# Patient Record
Sex: FEMALE | Race: BLACK OR AFRICAN AMERICAN | NOT HISPANIC OR LATINO | Employment: OTHER | URBAN - METROPOLITAN AREA
[De-identification: names, ages, dates, MRNs, and addresses within clinical notes are randomized per-mention and may not be internally consistent; named-entity substitution may affect disease eponyms.]

---

## 2017-02-03 ENCOUNTER — APPOINTMENT (OUTPATIENT)
Dept: GENERAL RADIOLOGY | Facility: HOSPITAL | Age: 77
End: 2017-02-03

## 2017-02-03 ENCOUNTER — HOSPITAL ENCOUNTER (EMERGENCY)
Facility: HOSPITAL | Age: 77
Discharge: HOME | End: 2017-02-03
Attending: EMERGENCY MEDICINE | Admitting: EMERGENCY MEDICINE

## 2017-02-03 VITALS
HEART RATE: 70 BPM | BODY MASS INDEX: 34.36 KG/M2 | SYSTOLIC BLOOD PRESSURE: 152 MMHG | OXYGEN SATURATION: 97 % | WEIGHT: 182 LBS | DIASTOLIC BLOOD PRESSURE: 96 MMHG | RESPIRATION RATE: 16 BRPM | HEIGHT: 61 IN | TEMPERATURE: 97.1 F
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
## 2017-02-03 DIAGNOSIS — S46.819A TRAPEZIUS MUSCLE STRAIN, UNSPECIFIED LATERALITY, INITIAL ENCOUNTER: ICD-10-CM

## 2017-02-03 PROCEDURE — 72050 X-RAY EXAM NECK SPINE 4/5VWS: CPT

## 2017-02-03 PROCEDURE — 99283 EMERGENCY DEPT VISIT LOW MDM: CPT

## 2017-02-03 RX ORDER — AMIODARONE HYDROCHLORIDE 200 MG/1
200 TABLET ORAL DAILY
COMMUNITY

## 2017-02-03 RX ORDER — DORZOLAMIDE HCL 20 MG/ML
1 SOLUTION/ DROPS OPHTHALMIC NIGHTLY
COMMUNITY

## 2017-02-03 RX ORDER — LOSARTAN POTASSIUM 100 MG/1
100 TABLET ORAL DAILY
COMMUNITY

## 2017-02-03 RX ORDER — BRIMONIDINE TARTRATE 2 MG/ML
1 SOLUTION/ DROPS OPHTHALMIC 3 TIMES DAILY
COMMUNITY

## 2017-02-03 RX ORDER — CETIRIZINE HYDROCHLORIDE 10 MG/1
10 TABLET ORAL DAILY
COMMUNITY

## 2017-02-03 RX ORDER — ALBUTEROL SULFATE 2.5 MG/3ML
2.5 SOLUTION RESPIRATORY (INHALATION) EVERY 6 HOURS PRN
COMMUNITY

## 2017-02-03 RX ORDER — CLOPIDOGREL BISULFATE 75 MG/1
75 TABLET ORAL DAILY
COMMUNITY
End: 2017-08-31

## 2017-02-03 RX ORDER — FUROSEMIDE 40 MG/1
40 TABLET ORAL DAILY
COMMUNITY

## 2017-02-03 RX ORDER — FERROUS SULFATE 325(65) MG
325 TABLET ORAL 2 TIMES DAILY
COMMUNITY

## 2017-02-03 RX ORDER — RANITIDINE 150 MG/1
150 TABLET ORAL EVERY MORNING
COMMUNITY
End: 2017-08-31

## 2017-02-03 RX ORDER — ALBUTEROL SULFATE 90 UG/1
2 AEROSOL, METERED RESPIRATORY (INHALATION) EVERY 6 HOURS PRN
COMMUNITY

## 2017-02-03 RX ORDER — HYDRALAZINE HYDROCHLORIDE 25 MG/1
25 TABLET, FILM COATED ORAL 2 TIMES DAILY
COMMUNITY

## 2017-02-03 RX ORDER — MELATONIN
1000 DAILY
COMMUNITY

## 2017-02-03 RX ORDER — POTASSIUM CHLORIDE 750 MG/1
10 CAPSULE, EXTENDED RELEASE ORAL 2 TIMES DAILY
COMMUNITY

## 2017-02-03 RX ORDER — TRAVOPROST OPHTHALMIC SOLUTION 0.04 MG/ML
1 SOLUTION OPHTHALMIC 3 TIMES DAILY
COMMUNITY
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
## 2017-08-30 PROBLEM — M16.11 PRIMARY OSTEOARTHRITIS OF RIGHT HIP: Chronic | Status: ACTIVE | Noted: 2017-08-30

## 2017-08-31 ENCOUNTER — APPOINTMENT (OUTPATIENT)
Dept: PREADMISSION TESTING | Facility: HOSPITAL | Age: 77
End: 2017-08-31

## 2017-08-31 ENCOUNTER — HOSPITAL ENCOUNTER (OUTPATIENT)
Dept: GENERAL RADIOLOGY | Facility: HOSPITAL | Age: 77
Discharge: HOME | End: 2017-08-31
Admitting: ORTHOPAEDIC SURGERY

## 2017-08-31 ENCOUNTER — HOSPITAL ENCOUNTER (OUTPATIENT)
Dept: GENERAL RADIOLOGY | Facility: HOSPITAL | Age: 77
Discharge: HOME | End: 2017-08-31

## 2017-08-31 VITALS
HEIGHT: 61 IN | HEART RATE: 58 BPM | OXYGEN SATURATION: 98 % | SYSTOLIC BLOOD PRESSURE: 133 MMHG | BODY MASS INDEX: 34.19 KG/M2 | RESPIRATION RATE: 20 BRPM | WEIGHT: 181.1 LBS | DIASTOLIC BLOOD PRESSURE: 75 MMHG | TEMPERATURE: 97.5 F

## 2017-08-31 LAB
ALBUMIN SERPL-MCNC: 4.2 G/DL (ref 3.5–5.2)
ALBUMIN/GLOB SERPL: 1.2 G/DL
ALP SERPL-CCNC: 82 U/L (ref 39–117)
ALT SERPL W P-5'-P-CCNC: 11 U/L (ref 1–33)
ANION GAP SERPL CALCULATED.3IONS-SCNC: 10.9 MMOL/L
APTT PPP: 29.9 SECONDS (ref 22.7–35.4)
AST SERPL-CCNC: 17 U/L (ref 1–32)
BACTERIA UR QL AUTO: NORMAL /HPF
BASOPHILS # BLD AUTO: 0.04 10*3/MM3 (ref 0–0.2)
BASOPHILS NFR BLD AUTO: 1 % (ref 0–1.5)
BILIRUB SERPL-MCNC: 0.4 MG/DL (ref 0.1–1.2)
BILIRUB UR QL STRIP: NEGATIVE
BUN BLD-MCNC: 12 MG/DL (ref 8–23)
BUN/CREAT SERPL: 7.9 (ref 7–25)
CALCIUM SPEC-SCNC: 9.6 MG/DL (ref 8.6–10.5)
CHLORIDE SERPL-SCNC: 102 MMOL/L (ref 98–107)
CLARITY UR: CLEAR
CO2 SERPL-SCNC: 31.1 MMOL/L (ref 22–29)
COLOR UR: YELLOW
CREAT BLD-MCNC: 1.51 MG/DL (ref 0.57–1)
DEPRECATED RDW RBC AUTO: 51.4 FL (ref 37–54)
EOSINOPHIL # BLD AUTO: 0.22 10*3/MM3 (ref 0–0.7)
EOSINOPHIL NFR BLD AUTO: 5.8 % (ref 0.3–6.2)
ERYTHROCYTE [DISTWIDTH] IN BLOOD BY AUTOMATED COUNT: 14.5 % (ref 11.7–13)
GFR SERPL CREATININE-BSD FRML MDRD: 41 ML/MIN/1.73
GLOBULIN UR ELPH-MCNC: 3.4 GM/DL
GLUCOSE BLD-MCNC: 92 MG/DL (ref 65–99)
GLUCOSE UR STRIP-MCNC: NEGATIVE MG/DL
HBA1C MFR BLD: 5.32 % (ref 4.8–5.6)
HCT VFR BLD AUTO: 41.7 % (ref 35.6–45.5)
HGB BLD-MCNC: 13 G/DL (ref 11.9–15.5)
HGB UR QL STRIP.AUTO: NEGATIVE
HYALINE CASTS UR QL AUTO: NORMAL /LPF
IMM GRANULOCYTES # BLD: 0 10*3/MM3 (ref 0–0.03)
IMM GRANULOCYTES NFR BLD: 0 % (ref 0–0.5)
INR PPP: 1.01 (ref 0.9–1.1)
KETONES UR QL STRIP: NEGATIVE
LEUKOCYTE ESTERASE UR QL STRIP.AUTO: ABNORMAL
LYMPHOCYTES # BLD AUTO: 1.31 10*3/MM3 (ref 0.9–4.8)
LYMPHOCYTES NFR BLD AUTO: 34.4 % (ref 19.6–45.3)
MCH RBC QN AUTO: 29.9 PG (ref 26.9–32)
MCHC RBC AUTO-ENTMCNC: 31.2 G/DL (ref 32.4–36.3)
MCV RBC AUTO: 95.9 FL (ref 80.5–98.2)
MONOCYTES # BLD AUTO: 0.4 10*3/MM3 (ref 0.2–1.2)
MONOCYTES NFR BLD AUTO: 10.5 % (ref 5–12)
NEUTROPHILS # BLD AUTO: 1.84 10*3/MM3 (ref 1.9–8.1)
NEUTROPHILS NFR BLD AUTO: 48.3 % (ref 42.7–76)
NITRITE UR QL STRIP: NEGATIVE
PH UR STRIP.AUTO: 7.5 [PH] (ref 5–8)
PLATELET # BLD AUTO: 289 10*3/MM3 (ref 140–500)
PMV BLD AUTO: 12.2 FL (ref 6–12)
POTASSIUM BLD-SCNC: 3.9 MMOL/L (ref 3.5–5.2)
PROT SERPL-MCNC: 7.6 G/DL (ref 6–8.5)
PROT UR QL STRIP: NEGATIVE
PROTHROMBIN TIME: 12.9 SECONDS (ref 11.7–14.2)
RBC # BLD AUTO: 4.35 10*6/MM3 (ref 3.9–5.2)
RBC # UR: NORMAL /HPF
REF LAB TEST METHOD: NORMAL
SODIUM BLD-SCNC: 144 MMOL/L (ref 136–145)
SP GR UR STRIP: 1.01 (ref 1–1.03)
SQUAMOUS #/AREA URNS HPF: NORMAL /HPF
UROBILINOGEN UR QL STRIP: ABNORMAL
WBC NRBC COR # BLD: 3.81 10*3/MM3 (ref 4.5–10.7)
WBC UR QL AUTO: NORMAL /HPF

## 2017-08-31 PROCEDURE — 93010 ELECTROCARDIOGRAM REPORT: CPT | Performed by: INTERNAL MEDICINE

## 2017-08-31 RX ORDER — DORZOLAMIDE HCL 20 MG/ML
1 SOLUTION/ DROPS OPHTHALMIC 3 TIMES DAILY
COMMUNITY
End: 2017-08-31

## 2017-08-31 RX ORDER — OXYMETAZOLINE HYDROCHLORIDE 0.05 G/100ML
3 SPRAY NASAL AS NEEDED
Status: ON HOLD | COMMUNITY
Start: 2015-01-15 | End: 2017-09-01

## 2017-08-31 RX ORDER — CHLORHEXIDINE GLUCONATE 500 MG/1
CLOTH TOPICAL
Status: ON HOLD | COMMUNITY
End: 2017-09-01

## 2017-09-01 ENCOUNTER — ANESTHESIA (OUTPATIENT)
Dept: PERIOP | Facility: HOSPITAL | Age: 77
End: 2017-09-01

## 2017-09-01 ENCOUNTER — APPOINTMENT (OUTPATIENT)
Dept: GENERAL RADIOLOGY | Facility: HOSPITAL | Age: 77
End: 2017-09-01

## 2017-09-01 ENCOUNTER — HOSPITAL ENCOUNTER (INPATIENT)
Facility: HOSPITAL | Age: 77
LOS: 3 days | Discharge: NURSING, REHAB OR LONG-TERM CARE | End: 2017-09-04
Attending: ORTHOPAEDIC SURGERY | Admitting: ORTHOPAEDIC SURGERY

## 2017-09-01 ENCOUNTER — ANESTHESIA EVENT (OUTPATIENT)
Dept: PERIOP | Facility: HOSPITAL | Age: 77
End: 2017-09-01

## 2017-09-01 DIAGNOSIS — R26.2 DIFFICULTY WALKING: Primary | ICD-10-CM

## 2017-09-01 PROBLEM — M16.10 OSTEOARTHRITIS OF ONE HIP: Status: ACTIVE | Noted: 2017-09-01

## 2017-09-01 PROCEDURE — 25010000002 HYDRALAZINE PER 20 MG: Performed by: ANESTHESIOLOGY

## 2017-09-01 PROCEDURE — 93010 ELECTROCARDIOGRAM REPORT: CPT | Performed by: INTERNAL MEDICINE

## 2017-09-01 PROCEDURE — 25010000002 HYDROMORPHONE PER 4 MG: Performed by: ANESTHESIOLOGY

## 2017-09-01 PROCEDURE — 73501 X-RAY EXAM HIP UNI 1 VIEW: CPT

## 2017-09-01 PROCEDURE — C1713 ANCHOR/SCREW BN/BN,TIS/BN: HCPCS | Performed by: ORTHOPAEDIC SURGERY

## 2017-09-01 PROCEDURE — C1776 JOINT DEVICE (IMPLANTABLE): HCPCS | Performed by: ORTHOPAEDIC SURGERY

## 2017-09-01 PROCEDURE — 25010000002 ONDANSETRON PER 1 MG: Performed by: ANESTHESIOLOGY

## 2017-09-01 PROCEDURE — 25010000002 MORPHINE PER 10 MG: Performed by: ORTHOPAEDIC SURGERY

## 2017-09-01 PROCEDURE — 25010000003 CEFAZOLIN IN DEXTROSE 2-4 GM/100ML-% SOLUTION: Performed by: ORTHOPAEDIC SURGERY

## 2017-09-01 PROCEDURE — 25010000002 PROPOFOL 10 MG/ML EMULSION: Performed by: ANESTHESIOLOGY

## 2017-09-01 PROCEDURE — 25010000002 MIDAZOLAM PER 1 MG: Performed by: ANESTHESIOLOGY

## 2017-09-01 PROCEDURE — 25010000002 KETOROLAC TROMETHAMINE PER 15 MG: Performed by: ORTHOPAEDIC SURGERY

## 2017-09-01 PROCEDURE — 97110 THERAPEUTIC EXERCISES: CPT

## 2017-09-01 PROCEDURE — 76000 FLUOROSCOPY <1 HR PHYS/QHP: CPT

## 2017-09-01 PROCEDURE — 25010000002 NEOSTIGMINE PER 0.5 MG: Performed by: NURSE ANESTHETIST, CERTIFIED REGISTERED

## 2017-09-01 PROCEDURE — 94640 AIRWAY INHALATION TREATMENT: CPT

## 2017-09-01 PROCEDURE — 25010000002 PHENYLEPHRINE PER 1 ML: Performed by: ANESTHESIOLOGY

## 2017-09-01 PROCEDURE — 25010000002 ROPIVACAINE PER 1 MG: Performed by: ORTHOPAEDIC SURGERY

## 2017-09-01 PROCEDURE — 25010000002 FENTANYL CITRATE (PF) 100 MCG/2ML SOLUTION: Performed by: ANESTHESIOLOGY

## 2017-09-01 PROCEDURE — 97162 PT EVAL MOD COMPLEX 30 MIN: CPT

## 2017-09-01 PROCEDURE — 25010000002 ONDANSETRON PER 1 MG: Performed by: ORTHOPAEDIC SURGERY

## 2017-09-01 PROCEDURE — 93005 ELECTROCARDIOGRAM TRACING: CPT | Performed by: ORTHOPAEDIC SURGERY

## 2017-09-01 PROCEDURE — 25010000002 CLONIDINE PER 1 MG: Performed by: ORTHOPAEDIC SURGERY

## 2017-09-01 PROCEDURE — 0SR904A REPLACEMENT OF RIGHT HIP JOINT WITH CERAMIC ON POLYETHYLENE SYNTHETIC SUBSTITUTE, UNCEMENTED, OPEN APPROACH: ICD-10-PCS | Performed by: ORTHOPAEDIC SURGERY

## 2017-09-01 PROCEDURE — 25010000002 DEXAMETHASONE PER 1 MG: Performed by: ANESTHESIOLOGY

## 2017-09-01 DEVICE — CORAIL HIP SYSTEM CEMENTLESS FEMORAL STEM 12/14 AMT 135 DEGREES KA SIZE 13 HA COATED STANDARD COLLAR
Type: IMPLANTABLE DEVICE | Site: HIP | Status: FUNCTIONAL
Brand: CORAIL

## 2017-09-01 DEVICE — PINNACLE CANCELLOUS BONE SCREW 6.5MM X 25MM
Type: IMPLANTABLE DEVICE | Site: HIP | Status: FUNCTIONAL
Brand: PINNACLE

## 2017-09-01 DEVICE — TOTL HIP STEM DEPUY UPCHRG: Type: IMPLANTABLE DEVICE | Site: HIP | Status: FUNCTIONAL

## 2017-09-01 DEVICE — TOTL HIP COP DEPUY 9641334: Type: IMPLANTABLE DEVICE | Site: HIP | Status: FUNCTIONAL

## 2017-09-01 DEVICE — TOTL HIP GRIPTION CUP DEPUY UPCHRG: Type: IMPLANTABLE DEVICE | Site: HIP | Status: FUNCTIONAL

## 2017-09-01 DEVICE — PINNACLE GRIPTION ACETABULAR SHELL SECTOR 50MM OD
Type: IMPLANTABLE DEVICE | Site: HIP | Status: FUNCTIONAL
Brand: PINNACLE GRIPTION

## 2017-09-01 DEVICE — BIOLOX DELTA CERAMIC FEMORAL HEAD 32MM DIA +5.0 12/14 TAPER
Type: IMPLANTABLE DEVICE | Site: HIP | Status: FUNCTIONAL
Brand: BIOLOX DELTA

## 2017-09-01 DEVICE — PINNACLE HIP SOLUTIONS ALTRX POLYETHYLENE ACETABULAR LINER NEUTRAL 32MM ID 50MM OD
Type: IMPLANTABLE DEVICE | Site: HIP | Status: FUNCTIONAL
Brand: PINNACLE ALTRX

## 2017-09-01 RX ORDER — HYDRALAZINE HYDROCHLORIDE 20 MG/ML
5 INJECTION INTRAMUSCULAR; INTRAVENOUS
Status: DISCONTINUED | OUTPATIENT
Start: 2017-09-01 | End: 2017-09-01 | Stop reason: HOSPADM

## 2017-09-01 RX ORDER — FENTANYL CITRATE 50 UG/ML
50 INJECTION, SOLUTION INTRAMUSCULAR; INTRAVENOUS
Status: DISCONTINUED | OUTPATIENT
Start: 2017-09-01 | End: 2017-09-01 | Stop reason: HOSPADM

## 2017-09-01 RX ORDER — MIDAZOLAM HYDROCHLORIDE 1 MG/ML
2 INJECTION INTRAMUSCULAR; INTRAVENOUS
Status: DISCONTINUED | OUTPATIENT
Start: 2017-09-01 | End: 2017-09-01 | Stop reason: HOSPADM

## 2017-09-01 RX ORDER — FUROSEMIDE 40 MG/1
40 TABLET ORAL DAILY
Status: DISCONTINUED | OUTPATIENT
Start: 2017-09-01 | End: 2017-09-01

## 2017-09-01 RX ORDER — CLOPIDOGREL BISULFATE 75 MG/1
75 TABLET ORAL DAILY
Status: DISCONTINUED | OUTPATIENT
Start: 2017-09-01 | End: 2017-09-04 | Stop reason: HOSPADM

## 2017-09-01 RX ORDER — CEFAZOLIN SODIUM 2 G/100ML
2 INJECTION, SOLUTION INTRAVENOUS ONCE
Status: COMPLETED | OUTPATIENT
Start: 2017-09-01 | End: 2017-09-01

## 2017-09-01 RX ORDER — BUDESONIDE AND FORMOTEROL FUMARATE DIHYDRATE 160; 4.5 UG/1; UG/1
2 AEROSOL RESPIRATORY (INHALATION) 2 TIMES DAILY
Status: DISCONTINUED | OUTPATIENT
Start: 2017-09-01 | End: 2017-09-03

## 2017-09-01 RX ORDER — ALBUTEROL SULFATE 2.5 MG/3ML
2.5 SOLUTION RESPIRATORY (INHALATION) EVERY 6 HOURS PRN
Status: DISCONTINUED | OUTPATIENT
Start: 2017-09-01 | End: 2017-09-04 | Stop reason: HOSPADM

## 2017-09-01 RX ORDER — CEFAZOLIN SODIUM 2 G/100ML
2 INJECTION, SOLUTION INTRAVENOUS EVERY 8 HOURS
Status: COMPLETED | OUTPATIENT
Start: 2017-09-01 | End: 2017-09-02

## 2017-09-01 RX ORDER — GLYCOPYRROLATE 0.2 MG/ML
INJECTION INTRAMUSCULAR; INTRAVENOUS AS NEEDED
Status: DISCONTINUED | OUTPATIENT
Start: 2017-09-01 | End: 2017-09-01 | Stop reason: SURG

## 2017-09-01 RX ORDER — EPHEDRINE SULFATE 50 MG/ML
5 INJECTION, SOLUTION INTRAVENOUS ONCE AS NEEDED
Status: DISCONTINUED | OUTPATIENT
Start: 2017-09-01 | End: 2017-09-01 | Stop reason: HOSPADM

## 2017-09-01 RX ORDER — SODIUM CHLORIDE 9 MG/ML
100 INJECTION, SOLUTION INTRAVENOUS CONTINUOUS
Status: DISCONTINUED | OUTPATIENT
Start: 2017-09-01 | End: 2017-09-02

## 2017-09-01 RX ORDER — POTASSIUM CHLORIDE 750 MG/1
10 CAPSULE, EXTENDED RELEASE ORAL 2 TIMES DAILY
Status: DISCONTINUED | OUTPATIENT
Start: 2017-09-01 | End: 2017-09-04 | Stop reason: HOSPADM

## 2017-09-01 RX ORDER — SODIUM CHLORIDE 9 MG/ML
100 INJECTION, SOLUTION INTRAVENOUS CONTINUOUS
Status: ACTIVE | OUTPATIENT
Start: 2017-09-01 | End: 2017-09-02

## 2017-09-01 RX ORDER — FAMOTIDINE 10 MG/ML
20 INJECTION, SOLUTION INTRAVENOUS ONCE
Status: COMPLETED | OUTPATIENT
Start: 2017-09-01 | End: 2017-09-01

## 2017-09-01 RX ORDER — DIPHENHYDRAMINE HYDROCHLORIDE 50 MG/ML
6.25 INJECTION INTRAMUSCULAR; INTRAVENOUS
Status: DISCONTINUED | OUTPATIENT
Start: 2017-09-01 | End: 2017-09-01 | Stop reason: HOSPADM

## 2017-09-01 RX ORDER — FERROUS SULFATE 325(65) MG
325 TABLET ORAL 2 TIMES DAILY
Status: DISCONTINUED | OUTPATIENT
Start: 2017-09-01 | End: 2017-09-04 | Stop reason: HOSPADM

## 2017-09-01 RX ORDER — BISACODYL 10 MG
10 SUPPOSITORY, RECTAL RECTAL DAILY PRN
Status: DISCONTINUED | OUTPATIENT
Start: 2017-09-01 | End: 2017-09-04 | Stop reason: HOSPADM

## 2017-09-01 RX ORDER — HYDROCODONE BITARTRATE AND ACETAMINOPHEN 7.5; 325 MG/1; MG/1
1 TABLET ORAL EVERY 4 HOURS PRN
Status: DISCONTINUED | OUTPATIENT
Start: 2017-09-01 | End: 2017-09-04 | Stop reason: HOSPADM

## 2017-09-01 RX ORDER — FENTANYL CITRATE 50 UG/ML
100 INJECTION, SOLUTION INTRAMUSCULAR; INTRAVENOUS
Status: DISCONTINUED | OUTPATIENT
Start: 2017-09-01 | End: 2017-09-01 | Stop reason: HOSPADM

## 2017-09-01 RX ORDER — OXYCODONE HCL 10 MG/1
10 TABLET, FILM COATED, EXTENDED RELEASE ORAL ONCE
Status: COMPLETED | OUTPATIENT
Start: 2017-09-01 | End: 2017-09-01

## 2017-09-01 RX ORDER — DEXAMETHASONE SODIUM PHOSPHATE 10 MG/ML
INJECTION INTRAMUSCULAR; INTRAVENOUS AS NEEDED
Status: DISCONTINUED | OUTPATIENT
Start: 2017-09-01 | End: 2017-09-01 | Stop reason: SURG

## 2017-09-01 RX ORDER — BISACODYL 5 MG/1
10 TABLET, DELAYED RELEASE ORAL DAILY PRN
Status: DISCONTINUED | OUTPATIENT
Start: 2017-09-01 | End: 2017-09-04 | Stop reason: HOSPADM

## 2017-09-01 RX ORDER — ONDANSETRON 2 MG/ML
INJECTION INTRAMUSCULAR; INTRAVENOUS AS NEEDED
Status: DISCONTINUED | OUTPATIENT
Start: 2017-09-01 | End: 2017-09-01 | Stop reason: SURG

## 2017-09-01 RX ORDER — LABETALOL HYDROCHLORIDE 5 MG/ML
5 INJECTION, SOLUTION INTRAVENOUS
Status: DISCONTINUED | OUTPATIENT
Start: 2017-09-01 | End: 2017-09-01 | Stop reason: HOSPADM

## 2017-09-01 RX ORDER — ACETAMINOPHEN 325 MG/1
325 TABLET ORAL EVERY 4 HOURS PRN
Status: DISCONTINUED | OUTPATIENT
Start: 2017-09-01 | End: 2017-09-04 | Stop reason: HOSPADM

## 2017-09-01 RX ORDER — PROPOFOL 10 MG/ML
VIAL (ML) INTRAVENOUS AS NEEDED
Status: DISCONTINUED | OUTPATIENT
Start: 2017-09-01 | End: 2017-09-01 | Stop reason: SURG

## 2017-09-01 RX ORDER — LIDOCAINE HYDROCHLORIDE 10 MG/ML
0.5 INJECTION, SOLUTION EPIDURAL; INFILTRATION; INTRACAUDAL; PERINEURAL ONCE AS NEEDED
Status: DISCONTINUED | OUTPATIENT
Start: 2017-09-01 | End: 2017-09-01 | Stop reason: HOSPADM

## 2017-09-01 RX ORDER — DOCUSATE SODIUM 100 MG/1
100 CAPSULE, LIQUID FILLED ORAL 2 TIMES DAILY PRN
Status: DISCONTINUED | OUTPATIENT
Start: 2017-09-01 | End: 2017-09-04 | Stop reason: HOSPADM

## 2017-09-01 RX ORDER — SODIUM CHLORIDE, SODIUM LACTATE, POTASSIUM CHLORIDE, CALCIUM CHLORIDE 600; 310; 30; 20 MG/100ML; MG/100ML; MG/100ML; MG/100ML
9 INJECTION, SOLUTION INTRAVENOUS CONTINUOUS
Status: DISCONTINUED | OUTPATIENT
Start: 2017-09-01 | End: 2017-09-01 | Stop reason: HOSPADM

## 2017-09-01 RX ORDER — SODIUM CHLORIDE 0.9 % (FLUSH) 0.9 %
1-10 SYRINGE (ML) INJECTION AS NEEDED
Status: DISCONTINUED | OUTPATIENT
Start: 2017-09-01 | End: 2017-09-01 | Stop reason: HOSPADM

## 2017-09-01 RX ORDER — HYDROMORPHONE HYDROCHLORIDE 1 MG/ML
0.5 INJECTION, SOLUTION INTRAMUSCULAR; INTRAVENOUS; SUBCUTANEOUS
Status: DISCONTINUED | OUTPATIENT
Start: 2017-09-01 | End: 2017-09-01 | Stop reason: HOSPADM

## 2017-09-01 RX ORDER — HYDROCODONE BITARTRATE AND ACETAMINOPHEN 7.5; 325 MG/1; MG/1
1 TABLET ORAL ONCE AS NEEDED
Status: DISCONTINUED | OUTPATIENT
Start: 2017-09-01 | End: 2017-09-01 | Stop reason: HOSPADM

## 2017-09-01 RX ORDER — MAGNESIUM HYDROXIDE 1200 MG/15ML
LIQUID ORAL AS NEEDED
Status: DISCONTINUED | OUTPATIENT
Start: 2017-09-01 | End: 2017-09-01 | Stop reason: HOSPADM

## 2017-09-01 RX ORDER — OXYCODONE HYDROCHLORIDE AND ACETAMINOPHEN 5; 325 MG/1; MG/1
2 TABLET ORAL ONCE AS NEEDED
Status: DISCONTINUED | OUTPATIENT
Start: 2017-09-01 | End: 2017-09-01 | Stop reason: HOSPADM

## 2017-09-01 RX ORDER — HYDRALAZINE HYDROCHLORIDE 25 MG/1
25 TABLET, FILM COATED ORAL 2 TIMES DAILY
Status: DISCONTINUED | OUTPATIENT
Start: 2017-09-01 | End: 2017-09-04 | Stop reason: HOSPADM

## 2017-09-01 RX ORDER — FLUMAZENIL 0.1 MG/ML
0.2 INJECTION INTRAVENOUS AS NEEDED
Status: DISCONTINUED | OUTPATIENT
Start: 2017-09-01 | End: 2017-09-01 | Stop reason: HOSPADM

## 2017-09-01 RX ORDER — AMIODARONE HYDROCHLORIDE 200 MG/1
200 TABLET ORAL DAILY
Status: DISCONTINUED | OUTPATIENT
Start: 2017-09-02 | End: 2017-09-04 | Stop reason: HOSPADM

## 2017-09-01 RX ORDER — ONDANSETRON 2 MG/ML
4 INJECTION INTRAMUSCULAR; INTRAVENOUS EVERY 6 HOURS PRN
Status: DISCONTINUED | OUTPATIENT
Start: 2017-09-01 | End: 2017-09-04 | Stop reason: HOSPADM

## 2017-09-01 RX ORDER — ROCURONIUM BROMIDE 10 MG/ML
INJECTION, SOLUTION INTRAVENOUS AS NEEDED
Status: DISCONTINUED | OUTPATIENT
Start: 2017-09-01 | End: 2017-09-01 | Stop reason: SURG

## 2017-09-01 RX ORDER — LOSARTAN POTASSIUM 50 MG/1
100 TABLET ORAL DAILY
Status: DISCONTINUED | OUTPATIENT
Start: 2017-09-02 | End: 2017-09-04 | Stop reason: HOSPADM

## 2017-09-01 RX ORDER — ONDANSETRON 2 MG/ML
4 INJECTION INTRAMUSCULAR; INTRAVENOUS ONCE AS NEEDED
Status: DISCONTINUED | OUTPATIENT
Start: 2017-09-01 | End: 2017-09-01 | Stop reason: HOSPADM

## 2017-09-01 RX ORDER — SENNA AND DOCUSATE SODIUM 50; 8.6 MG/1; MG/1
2 TABLET, FILM COATED ORAL 2 TIMES DAILY
Status: DISCONTINUED | OUTPATIENT
Start: 2017-09-01 | End: 2017-09-04 | Stop reason: HOSPADM

## 2017-09-01 RX ORDER — DORZOLAMIDE HCL 20 MG/ML
1 SOLUTION/ DROPS OPHTHALMIC NIGHTLY
Status: DISCONTINUED | OUTPATIENT
Start: 2017-09-01 | End: 2017-09-04 | Stop reason: HOSPADM

## 2017-09-01 RX ORDER — HYDRALAZINE HYDROCHLORIDE 20 MG/ML
INJECTION INTRAMUSCULAR; INTRAVENOUS AS NEEDED
Status: DISCONTINUED | OUTPATIENT
Start: 2017-09-01 | End: 2017-09-01 | Stop reason: SURG

## 2017-09-01 RX ORDER — CLOPIDOGREL BISULFATE 75 MG/1
75 TABLET ORAL DAILY
COMMUNITY

## 2017-09-01 RX ORDER — NALOXONE HCL 0.4 MG/ML
0.4 VIAL (ML) INJECTION
Status: DISCONTINUED | OUTPATIENT
Start: 2017-09-01 | End: 2017-09-04 | Stop reason: HOSPADM

## 2017-09-01 RX ORDER — ALBUTEROL SULFATE 2.5 MG/3ML
2.5 SOLUTION RESPIRATORY (INHALATION) EVERY 6 HOURS PRN
Status: DISCONTINUED | OUTPATIENT
Start: 2017-09-01 | End: 2017-09-01 | Stop reason: SDUPTHER

## 2017-09-01 RX ORDER — FENTANYL CITRATE 50 UG/ML
INJECTION, SOLUTION INTRAMUSCULAR; INTRAVENOUS AS NEEDED
Status: DISCONTINUED | OUTPATIENT
Start: 2017-09-01 | End: 2017-09-01 | Stop reason: SURG

## 2017-09-01 RX ORDER — BRIMONIDINE TARTRATE 2 MG/ML
1 SOLUTION/ DROPS OPHTHALMIC 3 TIMES DAILY
Status: DISCONTINUED | OUTPATIENT
Start: 2017-09-01 | End: 2017-09-04 | Stop reason: HOSPADM

## 2017-09-01 RX ORDER — MIDAZOLAM HYDROCHLORIDE 1 MG/ML
1 INJECTION INTRAMUSCULAR; INTRAVENOUS
Status: DISCONTINUED | OUTPATIENT
Start: 2017-09-01 | End: 2017-09-01 | Stop reason: HOSPADM

## 2017-09-01 RX ORDER — ASPIRIN 325 MG
325 TABLET, DELAYED RELEASE (ENTERIC COATED) ORAL DAILY
Status: DISCONTINUED | OUTPATIENT
Start: 2017-09-02 | End: 2017-09-04 | Stop reason: HOSPADM

## 2017-09-01 RX ADMIN — FERROUS SULFATE TAB 325 MG (65 MG ELEMENTAL FE) 325 MG: 325 (65 FE) TAB at 16:45

## 2017-09-01 RX ADMIN — BRIMONIDINE TARTRATE 1 DROP: 2 SOLUTION OPHTHALMIC at 16:45

## 2017-09-01 RX ADMIN — TRANEXAMIC ACID 1000 MG: 100 INJECTION, SOLUTION INTRAVENOUS at 09:05

## 2017-09-01 RX ADMIN — SODIUM CHLORIDE, POTASSIUM CHLORIDE, SODIUM LACTATE AND CALCIUM CHLORIDE 9 ML/HR: 600; 310; 30; 20 INJECTION, SOLUTION INTRAVENOUS at 08:11

## 2017-09-01 RX ADMIN — FENTANYL CITRATE 25 MCG: 50 INJECTION INTRAMUSCULAR; INTRAVENOUS at 08:25

## 2017-09-01 RX ADMIN — OXYCODONE HYDROCHLORIDE 10 MG: 10 TABLET, FILM COATED, EXTENDED RELEASE ORAL at 08:11

## 2017-09-01 RX ADMIN — MORPHINE SULFATE 4 MG: 4 INJECTION, SOLUTION INTRAMUSCULAR; INTRAVENOUS at 22:50

## 2017-09-01 RX ADMIN — DOCUSATE SODIUM -SENNOSIDES 2 TABLET: 50; 8.6 TABLET, COATED ORAL at 16:45

## 2017-09-01 RX ADMIN — GLYCOPYRROLATE 0.6 MG: 0.2 INJECTION INTRAMUSCULAR; INTRAVENOUS at 10:57

## 2017-09-01 RX ADMIN — BRIMONIDINE TARTRATE 1 DROP: 2 SOLUTION OPHTHALMIC at 20:31

## 2017-09-01 RX ADMIN — PROPOFOL 160 MG: 10 INJECTION, EMULSION INTRAVENOUS at 08:34

## 2017-09-01 RX ADMIN — SODIUM CHLORIDE, POTASSIUM CHLORIDE, SODIUM LACTATE AND CALCIUM CHLORIDE: 600; 310; 30; 20 INJECTION, SOLUTION INTRAVENOUS at 10:00

## 2017-09-01 RX ADMIN — FENTANYL CITRATE 25 MCG: 50 INJECTION INTRAMUSCULAR; INTRAVENOUS at 08:40

## 2017-09-01 RX ADMIN — ROCURONIUM BROMIDE 40 MG: 10 INJECTION INTRAVENOUS at 08:38

## 2017-09-01 RX ADMIN — HYDRALAZINE HYDROCHLORIDE 25 MG: 25 TABLET, FILM COATED ORAL at 16:45

## 2017-09-01 RX ADMIN — HYDROMORPHONE HYDROCHLORIDE 0.5 MG: 1 INJECTION, SOLUTION INTRAMUSCULAR; INTRAVENOUS; SUBCUTANEOUS at 11:22

## 2017-09-01 RX ADMIN — MIDAZOLAM 1 MG: 1 INJECTION INTRAMUSCULAR; INTRAVENOUS at 08:11

## 2017-09-01 RX ADMIN — DORZOLAMIDE HCL 1 DROP: 20 SOLUTION/ DROPS OPHTHALMIC at 20:31

## 2017-09-01 RX ADMIN — SODIUM CHLORIDE 100 ML/HR: 9 INJECTION, SOLUTION INTRAVENOUS at 20:31

## 2017-09-01 RX ADMIN — CLOPIDOGREL 75 MG: 75 TABLET, FILM COATED ORAL at 16:45

## 2017-09-01 RX ADMIN — FENTANYL CITRATE 50 MCG: 50 INJECTION INTRAMUSCULAR; INTRAVENOUS at 09:08

## 2017-09-01 RX ADMIN — FENTANYL CITRATE 50 MCG: 50 INJECTION INTRAMUSCULAR; INTRAVENOUS at 11:10

## 2017-09-01 RX ADMIN — HYDROCODONE BITARTRATE AND ACETAMINOPHEN 1 TABLET: 7.5; 325 TABLET ORAL at 16:28

## 2017-09-01 RX ADMIN — PROPOFOL 20 MG: 10 INJECTION, EMULSION INTRAVENOUS at 08:40

## 2017-09-01 RX ADMIN — PHENYLEPHRINE HYDROCHLORIDE 100 MCG: 10 INJECTION INTRAVENOUS at 10:28

## 2017-09-01 RX ADMIN — SODIUM CHLORIDE 100 ML/HR: 9 INJECTION, SOLUTION INTRAVENOUS at 14:42

## 2017-09-01 RX ADMIN — ONDANSETRON 4 MG: 2 INJECTION INTRAMUSCULAR; INTRAVENOUS at 17:57

## 2017-09-01 RX ADMIN — DEXAMETHASONE SODIUM PHOSPHATE 8 MG: 10 INJECTION INTRAMUSCULAR; INTRAVENOUS at 08:55

## 2017-09-01 RX ADMIN — FAMOTIDINE 20 MG: 10 INJECTION, SOLUTION INTRAVENOUS at 08:10

## 2017-09-01 RX ADMIN — FUROSEMIDE 40 MG: 40 TABLET ORAL at 16:45

## 2017-09-01 RX ADMIN — FENTANYL CITRATE 100 MCG: 50 INJECTION INTRAMUSCULAR; INTRAVENOUS at 09:17

## 2017-09-01 RX ADMIN — BUDESONIDE AND FORMOTEROL FUMARATE DIHYDRATE 2 PUFF: 160; 4.5 AEROSOL RESPIRATORY (INHALATION) at 20:44

## 2017-09-01 RX ADMIN — NEOSTIGMINE METHYLSULFATE 3.5 MG: 1 INJECTION INTRAMUSCULAR; INTRAVENOUS; SUBCUTANEOUS at 10:57

## 2017-09-01 RX ADMIN — CEFAZOLIN SODIUM 2 G: 2 INJECTION, SOLUTION INTRAVENOUS at 08:22

## 2017-09-01 RX ADMIN — POTASSIUM CHLORIDE 10 MEQ: 750 CAPSULE, EXTENDED RELEASE ORAL at 16:45

## 2017-09-01 RX ADMIN — CEFAZOLIN SODIUM 2 G: 2 INJECTION, SOLUTION INTRAVENOUS at 16:40

## 2017-09-01 RX ADMIN — PROPOFOL 20 MG: 10 INJECTION, EMULSION INTRAVENOUS at 08:27

## 2017-09-01 RX ADMIN — ONDANSETRON 4 MG: 2 INJECTION INTRAMUSCULAR; INTRAVENOUS at 10:30

## 2017-09-01 RX ADMIN — HYDRALAZINE HYDROCHLORIDE 5 MG: 20 INJECTION INTRAMUSCULAR; INTRAVENOUS at 09:33

## 2017-09-01 NOTE — ANESTHESIA PREPROCEDURE EVALUATION
Anesthesia Evaluation     Patient summary reviewed and Nursing notes reviewed   NPO Solid Status: > 8 hours       Airway   Mallampati: II  TM distance: >3 FB  Neck ROM: full  no difficulty expected  Dental - normal exam   (+) upper dentures and lower dentures    Pulmonary - normal exam   (+) asthma,   Cardiovascular - normal exam    (+) hypertension, dysrhythmias Atrial Fib,       Neuro/Psych  (+) TIA, CVA,    GI/Hepatic/Renal/Endo - negative ROS     Musculoskeletal (-) negative ROS    Abdominal  - normal exam   Substance History - negative use     OB/GYN negative ob/gyn ROS         Other                                      Anesthesia Plan    ASA 3     general   (Jehovahs witness)  intravenous induction   Anesthetic plan and risks discussed with patient.    Plan discussed with CRNA.

## 2017-09-01 NOTE — ANESTHESIA PROCEDURE NOTES
Airway  Urgency: elective    Date/Time: 9/1/2017 8:41 AM  Airway not difficult    General Information and Staff    Patient location during procedure: OR  Anesthesiologist: HERVE FISHER    Indications and Patient Condition  Indications for airway management: airway protection    Preoxygenated: yes  Mask difficulty assessment: 1 - vent by mask    Final Airway Details  Final airway type: endotracheal airway      Successful airway: ETT  Cuffed: yes   Successful intubation technique: direct laryngoscopy  Endotracheal tube insertion site: oral  Blade: Vieira  Blade size: #2  ETT size: 7.0 mm  Cormack-Lehane Classification: grade I - full view of glottis  Placement verified by: chest auscultation and capnometry   Measured from: teeth  ETT to teeth (cm): 19  Number of attempts at approach: 1    Additional Comments  Pre oxygenated  Easy mask vent  Atraumatic intubation  + etco2  Breath sounds equal bilaterally

## 2017-09-01 NOTE — OP NOTE
Operative  Note    Patient: Juanis Ortega  YOB: 1940    Medical Record Number: [id number]    Attending Physician: Melania Mckay, *    Date of Service: 9/1/2017     PREOPERATIVE DIAGNOSIS: Osteoarthritis of right hip    POSTOPERATIVE DIAGNOSIS:  Osteoarthritis of right hip [M16.11]    PROCEDURE PERFORMED:  RT TOTAL HIP ARTHROPLASTY ANTERIOR WITH HANA TABLE by utilizing a Direct anterior approach with  Depuy Broussard Gription Acetabular  Shell Sector with  holes size 50 mm outer diameter and a neutral ALTRX  Polyethylene acetabular  liner- 32 mm internal diameter, and Corail  Cementless  Standard Collar femoral stem size # 13 with a Delta ceramic femoral head- 32 mm outer diameter, + 5 neck length by utilizing a Stanton table ([employer]).     SURGEON: Melania Mckay MD     ASSISTANT:   Quan Cat MD, Fellow  [facility]    The services of a first assist were necessary for performing the procedure safely and expeditiously.  The first assist was present for the entire duration of the case and helped with positioning, retraction and closure of the incision.     ANESTHESIA:  General  Anesthesiologist: Feliz Hamilton MD; Feliz Carter MD  CRNA: Rebecca Ridley CRNA     ESTIMATED BLOOD LOSS: 300 mL, cell saver was used, and 130 cc was transfused.    SPECIMENS: * No orders in the log *     COMPLICATIONS: Nil.     DRAINS: Nil          INDICATIONS: The patient is a 76 y.o. female who presented to my office with complaints of progressively worsening pain in the hip.  The patient has reached a point of disability secondary to the severe pain and immobility. The patient had difficulty with activities of daily living.  The patient has failed nonoperative management.  The medical history was reviewed. The patient was indicated for a  total hip arthroplasty. Likely risks and benefits of the procedure including, but not limited to infection, DVT, pulmonary embolism, leg length  discrepancy, recurrent dislocation, possibility of injury to nerves or vessels, and periprosthetic fractures have been discussed in detail. Despite the risks involved, the patient elected to proceed and informed consent was obtained. The patient was seen in the preoperative holding area, and the  operative site was marked. The patient was on Plavix and it has been held. She is a Jehovahs witness.    DESCRIPTION OF PROCEDURE: The patient has been transferred to Psychiatric Operating Room.   Preoperative antibiotics in the form of  Kefzol was given intravenously prior to the incision.   After achieving adequate general anesthesia, the patient was transferred onto the [place] table. All bony prominences were padded adequately.   The operative hip was prepped and draped in the usual sterile fashion.   Surgical timeout was done. Correct patient, surgical side and site were identified.  Tranexamic acid was given intravenously at the time of skin incision.    A skin incision was made overlying the anterolateral aspect of the  hip for about 10 cm from just below and lateral to the anterior/superior iliac spine. Skin and subcutaneous tissue were incised and the deep fascia was incised in line with the skin incision overlying the tensor fascia inés muscle. The tensor muscle was retracted laterally and interval between the sartorius and the rectus femoris medially and the tensor fascia inés muscle was developed. The lateral circumflex femoral vessels were identified. They were clamped, cut, and ligated. Unnamed deep fascia was incised. Capsule of the hip joint was identified. Retractors were placed extracapsularly. There was a large effusion and a thickened capsule. The capsule was incised in a L-shaped manner and the anterior capsule was tagged with FiberWire. Followed by this, the retractors were placed intracapsularly. Appropriate capsular releases were done along the inferior and  medial neck and along the  saddle of the femoral neck.  The femoral neck was identified. The femoral neck osteotomy was done according to the preoperative templating , utilizing an oscillating saw. Femoral head was extracted using a corkscrew without any difficulty. The femoral head revealed presence of extensive loss of articular cartilage in the superior weight bearing portion along with femoral head osteophytes.  The acetabular cavity was inspected and exposed appropriately by placing retractors taking care to protect the anterior neurovascular structures. . The labrum was excised, pulvinar was excised from the cotyloid fossa. Acetabular cavity was progressively reamed, maintaining the correct inclination and version under image intensifier control. Adequate medialization was obtained. Adequate fit was found to be obtained at reamer size  .  The  [place] Gription  Acetabular Shell Sector with  holes  50 mm was seated into position. It was found to be seated satisfactorily with adequate inclination and anteversion. There was good stability.  There was a two  cancellous screw placed into the superior weight-bearing portion of the acetabular dome. Followed by this, a polyethylene liner was seated into position, checked for stability. This was a 32 mm internal diameter,  highly cross linked neutral 0° lip liner.     The periarticular tissue was infiltrated with local anaesthetic injection which consisted of Naropin, clonidine and ketorolac mixture.     Attention was then directed to the proximal femur. The proximal femur was delivered by utilizing a trochanteric hook placed just distal to the vastus tubercle and proximal to the gluteus huma tendon. The leg was then externally rotated with the gross traction released and  hyperextension, adduction was done using the [place] table spar. The mechanical lift on the table was utilized to support the femur.  Appropriate capsular releases were made to expose the medial slope of the greater  trochanter and the proximal femur was delivered. The femoral canal was entered by removing the lateral femoral neck with a box chisel followed by serial broaching. Adequate fit was found to be obtained with a size 13 broach. A trial reduction was performed. Leg lengths and offset were found to be satisfactory under image intensifier on comparison with the opposite hip under image intensifier. Reduction was found to be satisfactory and there was good stability with range of motion of the hip in internal and external rotation. Having been satisfied with the trials, the hip joint was dislocated.     The femoral head trial broach was removed and Corail cementless  Standard Collar femoral stem size # 13 was seated into position. This was found to be satisfactorily seated with good stability.      The delta ceramic femoral head 32 mm + 5 mm neck length was seated onto the trunnion and impacted. Followed by this, the hip joint was reduced. Reduction was found to be satisfactory and image confirmed leg length, offset , implant position and stem fill of the femoral canal.  There were no iatrogenic fractures. Hip joint was thoroughly irrigated with saline. Soft tissue hemostasis was secured. The sponge count and needle count was correct. The FiberWire sutures was tagged to the anterior capsular flaps and they were tied to each other. The incision was closed in layers with vicryl and monocryl sutures and the patient was transferred to the recovery room in a stable condition.     The patient tolerated the procedure well. There were no complications. The patient had adequate distal pulses and good capillary refill.     The patient will be mobilized with physical therapy.   Antibiotic prophylaxis  in the form of Kefzol postoperatively two more doses will be given in the first 24 hours.   The  patient will be started on DVT prophylaxis with  Aspirin 325 mg once  daily starting on postoperative day #1. Also restart plavix.     I  discussed these satisfactory performance of the procedure with the patient's family and discussed with them the postoperative management.    Melania Mckay MD     Date: 9/1/2017  Time: 10:48 AM    cc: Abbey Pollard MD; MD Melania Rico, *

## 2017-09-01 NOTE — THERAPY EVALUATION
Acute Care - Physical Therapy Initial Evaluation  [facility]     Patient Name: Juanis Ortega  : 1940  MRN: [id number]  Today's Date: 2017   Onset of Illness/Injury or Date of Surgery Date: 17  Date of Referral to PT: 17  Referring Physician: Nely      Admit Date: 2017     Visit Dx:    ICD-10-CM ICD-9-CM   1. 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   • Breast cancer     LEFT BREAST CANCER   • Cardiac murmur     PER PT   • History of epistaxis     PREVIOUSLY ON ANTICOAG, NO LONGER   • History of gastroesophageal reflux (GERD)     CURRENTLY OFF ZANTAC   • History of transfusion     IN , NO REACTION, HOWEVER NOW JEHOVAHS WITNESS   • Hypertension    • Lupus    • Other bursal cyst, right hip    • Risk factors for obstructive sleep apnea    • Sarcoidosis    • Stroke    • TIA (transient ischemic attack)     X3, NO RESIDUAL PER PT     Past Surgical History:   Procedure Laterality Date   • HYSTERECTOMY     • INGUINAL HERNIA REPAIR Right    • IR CEREBRAL ANEURYSM COILING     • MASTECTOMY Left    • OOPHORECTOMY Right     FOR TUMOR          PT ASSESSMENT (last 72 hours)      PT Evaluation       17 1515 17 0753    Rehab Evaluation    Document Type evaluation  -PC     Subjective Information agree to therapy  -PC     Patient Effort, Rehab Treatment good  -PC     Symptoms Noted During/After Treatment dizziness  -PC     Symptoms Noted Comment pt became dizzy with walking, chair brought up, pt reclined, dizziness resolved  -PC     General Information    Patient Profile Review yes  -PC     Onset of Illness/Injury or Date of Surgery Date 17  -PC     Referring Physician Nely  -PC     General  Observations pt is in bed, very sleepy  -     Pertinent History Of Current Problem R THR, anterior  -PC     Precautions/Limitations fall precautions;anterior hip precautions- right  -PC     Prior Level of Function independent:  -PC     Equipment Currently Used at Home  cane, straight;raised toilet  -    Plans/Goals Discussed With patient  -PC     Benefits Reviewed patient:  -PC     Living Environment    Lives With  spouse  -    Living Arrangements  house  -    Home Accessibility  bed and bath on same level;stairs to enter home;stairs within home  -    Number of Stairs to Enter Home  5  -SS    Number of Stairs Within Home  24   TWO FLIGHTS.  -    Stair Railings at Home  outside, present at both sides;inside, present at both sides  -    Type of Financial/Environmental Concern  none  -    Transportation Available  car  -    Clinical Impression    Date of Referral to PT 09/01/17  -PC     Patient/Family Goals Statement did not state  -     Criteria for Skilled Therapeutic Interventions Met yes;treatment indicated  -PC     Impairments Found (describe specific impairments) gait, locomotion, and balance;ROM;motor function  -     Rehab Potential good, to achieve stated therapy goals  -PC     Vital Signs    Post Systolic BP Rehab 152  -PC     Post Treatment Diastolic BP 72  -PC     Posttreatment Heart Rate (beats/min) 57  -PC     Post SpO2 (%) 99  -PC     Pain Assessment    Pain Assessment 0-10  -PC     Pain Score 6  -PC     Pain Location Hip  -PC     Pain Orientation Right  -PC     Pain Intervention(s) Medication (See MAR);Repositioned;Cold applied  -     Cognitive Assessment/Intervention    Current Cognitive/Communication Assessment impaired   still groggy from surgery  -PC     Orientation Status oriented to;person;place  -PC     Follows Commands/Answers Questions able to follow single-step instructions;needs cueing;needs increased time;75% of the time  -     Personal Safety decreased insight to  deficits  -PC     ROM (Range of Motion)    General ROM Detail WFL x RLE  -PC     MMT (Manual Muscle Testing)    General MMT Assessment Detail WFL x RLE  -PC     Mobility Assessment/Training    Extremity Weight-Bearing Status right lower extremity  -PC     Right Lower Extremity Weight-Bearing weight-bearing as tolerated  -PC     Bed Mobility, Assessment/Treatment    Bed Mob, Supine to Sit, [place] moderate assist (50% patient effort);2 person assist required  -PC     Transfer Assessment/Treatment    Transfers, Sit-Stand [place] minimum assist (75% patient effort);moderate assist (50% patient effort);2 person assist required  -PC     Transfers, Stand-Sit [place] moderate assist (50% patient effort);2 person assist required  -PC     Gait Assessment/Treatment    Gait, [place] Level minimum assist (75% patient effort);2 person assist required;verbal cues required;nonverbal cues required (demo/gesture)  -PC     Gait, Assistive Device rolling walker  -PC     Gait, Distance (Feet) 20  -PC     Gait, Gait Deviations right:;antalgic;step length decreased  -PC     Gait, Safety Issues step length decreased;sequencing ability decreased  -PC     Gait, Impairments strength decreased  -PC     Gait, Comment chair brought up due to dizziness, nauseous, pt reclined in chair, symptoms resolved, notified nsg  -PC     Therapy Exercises    Exercise Protocols total hip  -PC     Total Hip Exercises 5 reps;right:  -PC     Positioning and Restraints    Pre-Treatment Position in bed  -PC     Post Treatment Position chair  -PC     In Chair reclined;call light within reach;encouraged to call for assist;exit alarm on;with family/caregiver  -PC       08/31/17 5811       General Information    Equipment Currently Used at Home cane, straight  -AT     Living Environment    Lives With spouse  -AT     Living Arrangements house  -AT     Stair Railings at Home outside, present at both sides  -AT     Type of Financial/Environmental  Concern none  -AT     Transportation Available car  -AT       User Key  (r) = Recorded By, (t) = Taken By, (c) = Cosigned By    Initials Name Provider Type    PC Leona Garzon, PT Physical Therapist    EVA Fernando, RN Registered Nurse    AT [facility] [place] RN Registered Nurse          Physical Therapy Education     Title: PT OT SLP Therapies (Active)     Topic: Physical Therapy (Active)     Point: Mobility training (Active)    Learning Progress Summary    Learner Readiness Method Response Comment Documented by Status   Patient Acceptance E,D NR   09/01/17 1529 Active               Point: Home exercise program (Active)    Learning Progress Summary    Learner Readiness Method Response Comment Documented by Status   Patient Acceptance E,D NR   09/01/17 1529 Active               Point: Body mechanics (Active)    Learning Progress Summary    Learner Readiness Method Response Comment Documented by Status   Patient Acceptance E,D NR   09/01/17 1529 Active               Point: Precautions (Active)    Learning Progress Summary    Learner Readiness Method Response Comment Documented by Status   Patient Acceptance E,D NR   09/01/17 1529 Active                      User Key     Initials Effective Dates Name Provider Type Discipline     12/01/15 -  Leona Garzon, PT Physical Therapist PT                PT Recommendation and Plan  Anticipated Equipment Needs At Discharge: front wheeled walker, bedside commode  Anticipated Discharge Disposition: home with assist, home with home health  Planned Therapy Interventions: bed mobility training, gait training, transfer training, strengthening, ROM (Range of Motion)  PT Frequency: 2 times/day  Plan of Care Review  Plan Of Care Reviewed With: patient  Progress: progress towards functional goals is fair  Outcome Summary/Follow up Plan: pt presents with dec functional mobility, pain , and weakness post op R THR, she will benefit from PT to address. pt was groggy this  afternoon and after she walked 20 ft she became dizzy, chair brought up, symptoms resolved after being reclined. plans home with assist           IP PT Goals       09/01/17 1529          Bed Mobility PT LTG    Bed Mobility PT LTG, Date Established 09/01/17  -PC      Bed Mobility PT LTG, Time to Achieve 1 wk  -PC      Bed Mobility PT LTG, Activity Type supine to sit/sit to supine  -PC      Bed Mobility PT LTG, [place] Level minimum assist (75% patient effort)  -PC      Transfer Training PT LTG    Transfer Training PT LTG, Date Established 09/01/17  -PC      Transfer Training PT LTG, Time to Achieve 1 wk  -PC      Transfer Training PT LTG, Activity Type sit to stand/stand to sit  -PC      Transfer Training PT LTG, [place] Level contact guard assist  -PC      Gait Training PT LTG    Gait Training Goal PT LTG, Date Established 09/01/17  -PC      Gait Training Goal PT LTG, Time to Achieve 1 wk  -PC      Gait Training Goal PT LTG, [place] Level contact guard assist  -PC      Gait Training Goal PT LTG, Assist Device walker, rolling  -PC      Gait Training Goal PT LTG, Distance to Achieve 50 ft  -PC      Stair Training PT LTG    Stair Training Goal PT LTG, Date Established 09/01/17  -PC      Stair Training Goal PT LTG, Time to Achieve 1 wk  -PC      Stair Training Goal PT LTG, Number of Steps 4  -PC      Stair Training Goal PT LTG, [place] Level minimum assist (75% patient effort)  -PC      Stair Training Goal PT LTG, Assist Device 1 handrail  -PC        User Key  (r) = Recorded By, (t) = Taken By, (c) = Cosigned By    Initials Name Provider Type    ONEL Garzon, PT Physical Therapist                Outcome Measures       09/01/17 1500          How much help from another person do you currently need...    Turning from your back to your side while in flat bed without using bedrails? 2  -PC      Moving from lying on back to sitting on the side of a flat bed without bedrails? 2  -PC      Moving to and  from a bed to a chair (including a wheelchair)? 2  -PC      Standing up from a chair using your arms (e.g., wheelchair, bedside chair)? 2  -PC      Climbing 3-5 steps with a railing? 1  -PC      To walk in hospital room? 2  -PC      AM-PAC 6 Clicks Score 11  -PC      Functional Assessment    Outcome Measure Options AM-PAC 6 Clicks Basic Mobility (PT)  -PC        User Key  (r) = Recorded By, (t) = Taken By, (c) = Cosigned By    Initials Name Provider Type    PC Leona Garzon PT Physical Therapist           Time Calculation:         PT Charges       09/01/17 1533          Time Calculation    Start Time 1449  -PC      Stop Time 1510  -PC      Time Calculation (min) 21 min  -PC      PT Received On 09/01/17  -PC      PT - Next Appointment 09/02/17  -PC      PT Goal Re-Cert Due Date 09/08/17  -PC        User Key  (r) = Recorded By, (t) = Taken By, (c) = Cosigned By    Initials Name Provider Type     Leona Garzon PT Physical Therapist          Therapy Charges for Today     Code Description Service Date Service Provider Modifiers Qty    16508629706 HC PT EVAL MOD COMPLEXITY 2 9/1/2017 Leona Garzon, PT GP 1    64763050155 HC PT THER PROC EA 15 MIN 9/1/2017 Leona Garzon, PT GP 1    07122371335 HC PT THER SUPP EA 15 MIN 9/1/2017 Leona Garzon, PT GP 1          PT G-Codes  Outcome Measure Options: AM-PAC 6 Clicks Basic Mobility (PT)      Leona Garzon PT  9/1/2017

## 2017-09-01 NOTE — ANESTHESIA POSTPROCEDURE EVALUATION
"Patient: Juanis Ortega    Procedure Summary     Date Anesthesia Start Anesthesia Stop Room / Location    09/01/17 0822 1109  [facility] / [facility]       Procedure Diagnosis Surgeon Provider     RT TOTAL HIP ARTHROPLASTY ANTERIOR WITH HANA TABLE (Right Hip) Osteoarthritis of right hip MD Feliz Isaac MD          Anesthesia Type: general  Last vitals  BP   135/59 (09/01/17 1218)    Temp        Pulse   50 (09/01/17 1218)   Resp   18 (09/01/17 1218)    SpO2   92 % (09/01/17 1218)      Post Anesthesia Care and Evaluation    Patient location during evaluation: PACU  Patient participation: complete - patient participated  Level of consciousness: awake and alert  Pain management: adequate  Airway patency: patent  Anesthetic complications: No anesthetic complications    Cardiovascular status: acceptable  Respiratory status: acceptable  Hydration status: acceptable    Comments: /59 (BP Location: Left arm, Patient Position: Lying)  Pulse 50  Temp 36.3 °C (97.4 °F) (Oral)   Resp 18  Ht 61\" (154.9 cm)  Wt 186 lb 7 oz (84.6 kg)  SpO2 92%  BMI 35.23 kg/m2        "

## 2017-09-01 NOTE — PERIOPERATIVE NURSING NOTE
SPOKE WITH DR HARRINGTON, LET HIM KNOW WE HAD CONTACTED [facility] FOR RESULTS FROM THE CT SCAN OF PT'S CHEST THAT THE PT HAD DONE ON 8-31-17, AND [facility] STATES THE RESULTS ARE STILL PENDING THIS AM, HE VERBALIZED UNDERSTANDING.

## 2017-09-01 NOTE — PLAN OF CARE
Problem: Patient Care Overview (Adult)  Goal: Plan of Care Review  Outcome: Ongoing (interventions implemented as appropriate)    09/01/17 1529   Coping/Psychosocial Response Interventions   Plan Of Care Reviewed With patient   Patient Care Overview   Progress progress towards functional goals is fair   Outcome Evaluation   Outcome Summary/Follow up Plan pt presents with dec functional mobility, pain , and weakness post op R THR, she will benefit from PT to address. pt was groggy this afternoon and after she walked 20 ft she became dizzy, chair brought up, symptoms resolved after being reclined. plans home with assist          Problem: Inpatient Physical Therapy  Goal: Bed Mobility Goal LTG- PT  Outcome: Ongoing (interventions implemented as appropriate)    09/01/17 1529   Bed Mobility PT LTG   Bed Mobility PT LTG, Date Established 09/01/17   Bed Mobility PT LTG, Time to Achieve 1 wk   Bed Mobility PT LTG, Activity Type supine to sit/sit to supine   Bed Mobility PT LTG, [place] Level minimum assist (75% patient effort)       Goal: Transfer Training Goal 1 LTG- PT  Outcome: Ongoing (interventions implemented as appropriate)    09/01/17 1529   Transfer Training PT LTG   Transfer Training PT LTG, Date Established 09/01/17   Transfer Training PT LTG, Time to Achieve 1 wk   Transfer Training PT LTG, Activity Type sit to stand/stand to sit   Transfer Training PT LTG, [place] Level contact guard assist       Goal: Gait Training Goal LTG- PT  Outcome: Ongoing (interventions implemented as appropriate)    09/01/17 1529   Gait Training PT LTG   Gait Training Goal PT LTG, Date Established 09/01/17   Gait Training Goal PT LTG, Time to Achieve 1 wk   Gait Training Goal PT LTG, [place] Level contact guard assist   Gait Training Goal PT LTG, Assist Device walker, rolling   Gait Training Goal PT LTG, Distance to Achieve 50 ft       Goal: Stair Training Goal LTG- PT  Outcome: Ongoing (interventions implemented as  appropriate)    09/01/17 1529   Stair Training PT LTG   Stair Training Goal PT LTG, Date Established 09/01/17   Stair Training Goal PT LTG, Time to Achieve 1 wk   Stair Training Goal PT LTG, Number of Steps 4   Stair Training Goal PT LTG, [place] Level minimum assist (75% patient effort)   Stair Training Goal PT LTG, Assist Device 1 handrail

## 2017-09-01 NOTE — PLAN OF CARE
"Problem: Patient Care Overview (Adult)  Goal: Plan of Care Review  Outcome: Ongoing (interventions implemented as appropriate)    09/01/17 0801   Coping/Psychosocial Response Interventions   Plan Of Care Reviewed With patient   Patient Care Overview   Progress progress toward functional goals as expected       Goal: Adult Individualization and Mutuality  Outcome: Ongoing (interventions implemented as appropriate)    09/01/17 0801   Individualization   Patient Specific Preferences PT PREFERS TO BE CALLED \"GUY\"    Patient Specific Goals TO BE RELAXED FOR SX TODAY.   Patient Specific Interventions TO GIVE RELAXING MED PER AA ORDER   Mutuality/Individual Preferences   What Anxieties, Fears or Concerns Do You Have About Your Health or Care? NONE AT THIS TIME.    What Questions Do You Have About Your Health or Care? NONE AT THIS TIME.    What Information Would Help Us Give You More Personalized Care? SEE ABOVE.       Goal: Discharge Needs Assessment  Outcome: Ongoing (interventions implemented as appropriate)    Problem: Perioperative Period (Adult)  Goal: Signs and Symptoms of Listed Potential Problems Will be Absent or Manageable (Perioperative Period)  Outcome: Ongoing (interventions implemented as appropriate)    09/01/17 0801   Perioperative Period   Problems Assessed (Perioperative Period) all   Problems Present (Perioperative Period) none           "

## 2017-09-01 NOTE — BRIEF OP NOTE
TOTAL HIP ARTHROPLASTY ANTERIOR WITH HANA TABLE  Procedure Note    Juanis Ortega  9/1/2017    Pre-op Diagnosis: Osteoarthritis of right hip     Post-op Diagnosis:     Post-Op Diagnosis Codes:     * Osteoarthritis of right hip [M16.11]    Procedure/CPT® Codes:      Procedure(s):   RT TOTAL HIP ARTHROPLASTY ANTERIOR WITH HANA TABLE    Surgeon(s):  MD Quan Isaac MD    Anesthesia: General    Staff:   Circulator: Mckenzie Wade RN; ZHANG Galvan RN  Scrub Person: Nataliia Lantigua  Vendor Representative: Davi Adams  Orderly: Adam Ford  Assistant: Mehreen Mccarty; WALLACE Stewart    Estimated Blood Loss: 300 mL  Urine Voided: * No values recorded between 9/1/2017  8:20 AM and 9/1/2017 10:44 AM *    Specimens:                * No specimens in log *      Drains:           Findings: see dict     Complications: alecia Mckay MD     Date: 9/1/2017  Time: 10:44 AM

## 2017-09-01 NOTE — CONSULTS
Patient Identification:  Name: Juanis Ortega  Age/Sex: 76 y.o. female  :  1940  MRN: [id number]         Primary Care Physician: Abbey Pollard MD  Room:  P895/1              ConsultsDate of Admit: 2017  Date of Consult: 17  Subjective   History of Present Illness  75 y/o female with a h/o AFib on amiodarone, HTN, pulmonary sarcoid, prior CVA who comes in for elective R TASNEEM and we were consulted for management of her medical conditions. She is not on coumadin for her AFib due to repeated epistaxis in the past requiring packing. She is maintained in NSR on the amiodarone and denies any palpitations or chest pain. She is on plavix for the prior CVA. She has had no pulmonary issues related to her sarcoid. Has seen Dr Smith in the past for this. She states that she was getting light-headed with standing with therapy earlier. Currently she feels nauseated and is not eating much.     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   • Breast cancer     LEFT BREAST CANCER   • Cardiac murmur     PER PT   • History of epistaxis     PREVIOUSLY ON ANTICOAG, NO LONGER   • History of gastroesophageal reflux (GERD)     CURRENTLY OFF ZANTAC   • History of transfusion     IN , NO REACTION, HOWEVER NOW JEHOVAHS WITNESS   • Hypertension    • Lupus    • Other bursal cyst, right hip    • Risk factors for obstructive sleep apnea    • Sarcoidosis    • Stroke    • TIA (transient ischemic attack)     X3, NO RESIDUAL PER PT     Past Surgical History:   Procedure Laterality Date   • HYSTERECTOMY     • INGUINAL HERNIA REPAIR Right    • IR CEREBRAL ANEURYSM COILING     • MASTECTOMY Left    • OOPHORECTOMY Right     FOR TUMOR     Family History   Problem Relation Age of Onset   • Malig Hyperthermia Neg Hx      Social History   Substance Use Topics   • Smoking status: Never Smoker   •  Smokeless tobacco: None   • Alcohol use No     Prescriptions Prior to Admission   Medication Sig Dispense Refill Last Dose   • albuterol (PROVENTIL HFA;VENTOLIN HFA) 108 (90 BASE) MCG/ACT inhaler Inhale 2 puffs Every 6 (Six) Hours As Needed for wheezing or shortness of air.   5/1/2017   • albuterol (PROVENTIL) (2.5 MG/3ML) 0.083% nebulizer solution Take 2.5 mg by nebulization Every 6 (Six) Hours As Needed for shortness of air.   5/1/2017   • amiodarone (PACERONE) 200 MG tablet Take 200 mg by mouth Daily.   9/1/2017 at 0500   • brimonidine (ALPHAGAN) 0.2 % ophthalmic solution Administer 1 drop to both eyes 3 (Three) Times a Day.   8/31/2017 at 1800   • cetirizine (zyrTEC) 10 MG tablet Take 10 mg by mouth Daily.   8/31/2017 at 1000   • cholecalciferol (VITAMIN D3) 1000 UNITS tablet Take 1,000 Units by mouth Daily.   8/1/2017 at 0800   • clopidogrel (PLAVIX) 75 MG tablet Take 75 mg by mouth Daily.   8/25/2017   • dorzolamide (TRUSOPT) 2 % ophthalmic solution Administer 1 drop to both eyes Every Night.   8/31/2017 at 2200   • ferrous sulfate 325 (65 FE) MG tablet Take 325 mg by mouth 2 (Two) Times a Day. PREOP TO BUILD UP BLOOD RESERVES/JEHOVAHS WITNESS   8/31/2017 at 1000   • fluticasone-salmeterol (ADVAIR DISKUS) 250-50 MCG/DOSE DISKUS Inhale 1 puff 2 (Two) Times a Day.   9/1/2017 at 0500   • hydrALAZINE (APRESOLINE) 25 MG tablet Take 25 mg by mouth 2 (Two) Times a Day.   9/1/2017 at 0500   • potassium chloride (MICRO-K) 10 MEQ CR capsule Take 10 mEq by mouth 2 (Two) Times a Day.   8/31/2017 at 2200   • travoprost, BAK free, (TRAVATAN Z) 0.004 % solution ophthalmic solution Administer 1 drop to both eyes 3 (Three) Times a Day. in affected eye(s) ;  CONFIRM FREQUENCY ON BOTTLE   8/31/2017 at 2200   • furosemide (LASIX) 40 MG tablet Take 40 mg by mouth Daily. AND PRN -  A SECOND DOSE DAILY FOR ADDITIONAL SWELLING   8/31/2017 at 0800   • losartan (COZAAR) 100 MG tablet Take 100 mg by mouth Daily.   9/1/2017 at 0500   •  saline 0.65 % nasal solution (BABY AYR) 0.65 % solution 4 sprays into each nostril As Needed.   8/31/2017 at 2200     Allergies:  Hydrochlorothiazide w-triamterene    Review of Systems   Constitutional: Negative.    HENT: Negative.    Eyes: Negative.    Respiratory: Negative.    Cardiovascular: Negative.    Gastrointestinal: Positive for nausea. Negative for abdominal pain and vomiting.   Endocrine: Negative.    Genitourinary: Negative.    Musculoskeletal: Positive for arthralgias. Negative for myalgias.   Skin: Negative.    Neurological: Negative.    Hematological: Negative.    Psychiatric/Behavioral: Negative.        Objective      Vital Signs  Temp:  [96.8 °F (36 °C)-97.6 °F (36.4 °C)] 97.1 °F (36.2 °C)  Heart Rate:  [50-75] 61  Resp:  [16-20] 16  BP: (122-168)/(56-86) 122/56  Body mass index is 35.23 kg/(m^2).    Physical Exam   Constitutional: She is oriented to person, place, and time. She appears well-developed and well-nourished.   HENT:   Head: Normocephalic and atraumatic.   Mouth/Throat: No oropharyngeal exudate.   Eyes: Conjunctivae are normal. No scleral icterus.   Neck: Normal range of motion. No JVD present. No tracheal deviation present.   Cardiovascular: Normal rate and regular rhythm.    No murmur heard.  Pulmonary/Chest: Effort normal and breath sounds normal.   Abdominal: Soft. Bowel sounds are normal. She exhibits no distension. There is no tenderness.   Musculoskeletal: She exhibits no edema or deformity.   Neurological: She is alert and oriented to person, place, and time.   Skin: Skin is warm and dry.   Psychiatric: She has a normal mood and affect. Her behavior is normal. Judgment and thought content normal.       Results Review:    I reviewed the patient's new clinical results.    1. AFib  - amiodarone has been continued  - currently in NSR    2. HTN  - placed hold parameters on her BP meds  - I will actually stop her lasix right now with her getting light-headed when she stands and give  some gentle IVF. Reassess tomorrow and can restart lasix then if she is doing better  - checking BMP in am    3. Nausea  - related to anesthesia and pain medication most likely  - treat with anti-emetics    4. Prior CVA  - plavix has been conituned  - monitor H/H

## 2017-09-01 NOTE — PERIOPERATIVE NURSING NOTE
DR FISHER HERE, LET HIM KNOW ABOUT PT'S MASS IN LUNG THAT WAS DISCOVERED ON XRAY AND THAT DR HARRINGTON WAS AWARE. HE ORDERED A CT SCAN. PT HAD THE CT SCAN DONE YESTERDAY 8/31/2017 AT [facility], STILL AWAITING ON RESULTS. HE STATES OK.

## 2017-09-02 PROBLEM — M16.10 OSTEOARTHRITIS OF ONE HIP: Status: RESOLVED | Noted: 2017-09-01 | Resolved: 2017-09-02

## 2017-09-02 PROBLEM — M16.11 PRIMARY OSTEOARTHRITIS OF RIGHT HIP: Chronic | Status: RESOLVED | Noted: 2017-08-30 | Resolved: 2017-09-02

## 2017-09-02 LAB
ANION GAP SERPL CALCULATED.3IONS-SCNC: 15.7 MMOL/L
BASOPHILS # BLD AUTO: 0 10*3/MM3 (ref 0–0.2)
BASOPHILS NFR BLD AUTO: 0 % (ref 0–1.5)
BUN BLD-MCNC: 12 MG/DL (ref 8–23)
BUN/CREAT SERPL: 9.4 (ref 7–25)
CALCIUM SPEC-SCNC: 8.3 MG/DL (ref 8.6–10.5)
CHLORIDE SERPL-SCNC: 101 MMOL/L (ref 98–107)
CO2 SERPL-SCNC: 21.3 MMOL/L (ref 22–29)
CREAT BLD-MCNC: 1.28 MG/DL (ref 0.57–1)
DEPRECATED RDW RBC AUTO: 51.7 FL (ref 37–54)
EOSINOPHIL # BLD AUTO: 0 10*3/MM3 (ref 0–0.7)
EOSINOPHIL NFR BLD AUTO: 0 % (ref 0.3–6.2)
ERYTHROCYTE [DISTWIDTH] IN BLOOD BY AUTOMATED COUNT: 14.6 % (ref 11.7–13)
GFR SERPL CREATININE-BSD FRML MDRD: 49 ML/MIN/1.73
GLUCOSE BLD-MCNC: 129 MG/DL (ref 65–99)
HCT VFR BLD AUTO: 35.3 % (ref 35.6–45.5)
HGB BLD-MCNC: 10.9 G/DL (ref 11.9–15.5)
IMM GRANULOCYTES # BLD: 0.05 10*3/MM3 (ref 0–0.03)
IMM GRANULOCYTES NFR BLD: 0.4 % (ref 0–0.5)
LYMPHOCYTES # BLD AUTO: 0.53 10*3/MM3 (ref 0.9–4.8)
LYMPHOCYTES NFR BLD AUTO: 4.2 % (ref 19.6–45.3)
MCH RBC QN AUTO: 29.7 PG (ref 26.9–32)
MCHC RBC AUTO-ENTMCNC: 30.9 G/DL (ref 32.4–36.3)
MCV RBC AUTO: 96.2 FL (ref 80.5–98.2)
MONOCYTES # BLD AUTO: 1.11 10*3/MM3 (ref 0.2–1.2)
MONOCYTES NFR BLD AUTO: 8.9 % (ref 5–12)
NEUTROPHILS # BLD AUTO: 10.82 10*3/MM3 (ref 1.9–8.1)
NEUTROPHILS NFR BLD AUTO: 86.5 % (ref 42.7–76)
PLATELET # BLD AUTO: 243 10*3/MM3 (ref 140–500)
PMV BLD AUTO: 12.6 FL (ref 6–12)
POTASSIUM BLD-SCNC: 4.2 MMOL/L (ref 3.5–5.2)
RBC # BLD AUTO: 3.67 10*6/MM3 (ref 3.9–5.2)
SODIUM BLD-SCNC: 138 MMOL/L (ref 136–145)
WBC NRBC COR # BLD: 12.51 10*3/MM3 (ref 4.5–10.7)

## 2017-09-02 PROCEDURE — 80048 BASIC METABOLIC PNL TOTAL CA: CPT | Performed by: ORTHOPAEDIC SURGERY

## 2017-09-02 PROCEDURE — 97150 GROUP THERAPEUTIC PROCEDURES: CPT

## 2017-09-02 PROCEDURE — 94799 UNLISTED PULMONARY SVC/PX: CPT

## 2017-09-02 PROCEDURE — 25010000003 CEFAZOLIN IN DEXTROSE 2-4 GM/100ML-% SOLUTION: Performed by: ORTHOPAEDIC SURGERY

## 2017-09-02 PROCEDURE — 25010000002 ONDANSETRON PER 1 MG: Performed by: ORTHOPAEDIC SURGERY

## 2017-09-02 PROCEDURE — 97110 THERAPEUTIC EXERCISES: CPT

## 2017-09-02 PROCEDURE — 85025 COMPLETE CBC W/AUTO DIFF WBC: CPT | Performed by: ORTHOPAEDIC SURGERY

## 2017-09-02 RX ORDER — ONDANSETRON 4 MG/1
4 TABLET, ORALLY DISINTEGRATING ORAL EVERY 6 HOURS PRN
Status: DISCONTINUED | OUTPATIENT
Start: 2017-09-02 | End: 2017-09-04 | Stop reason: HOSPADM

## 2017-09-02 RX ORDER — BISACODYL 5 MG/1
10 TABLET, DELAYED RELEASE ORAL DAILY PRN
Qty: 30 TABLET | Refills: 0 | Status: SHIPPED | OUTPATIENT
Start: 2017-09-02 | End: 2017-09-12

## 2017-09-02 RX ORDER — HYDROCODONE BITARTRATE AND ACETAMINOPHEN 7.5; 325 MG/1; MG/1
1 TABLET ORAL EVERY 4 HOURS PRN
Qty: 72 TABLET | Refills: 0 | Status: SHIPPED | OUTPATIENT
Start: 2017-09-02 | End: 2017-09-11

## 2017-09-02 RX ORDER — FUROSEMIDE 40 MG/1
40 TABLET ORAL DAILY
Status: DISCONTINUED | OUTPATIENT
Start: 2017-09-03 | End: 2017-09-04 | Stop reason: HOSPADM

## 2017-09-02 RX ADMIN — DOCUSATE SODIUM -SENNOSIDES 2 TABLET: 50; 8.6 TABLET, COATED ORAL at 09:16

## 2017-09-02 RX ADMIN — AMIODARONE HYDROCHLORIDE 200 MG: 200 TABLET ORAL at 09:16

## 2017-09-02 RX ADMIN — ASPIRIN 325 MG: 325 TABLET, DELAYED RELEASE ORAL at 09:16

## 2017-09-02 RX ADMIN — BUDESONIDE AND FORMOTEROL FUMARATE DIHYDRATE 2 PUFF: 160; 4.5 AEROSOL RESPIRATORY (INHALATION) at 21:36

## 2017-09-02 RX ADMIN — CEFAZOLIN SODIUM 2 G: 2 INJECTION, SOLUTION INTRAVENOUS at 00:44

## 2017-09-02 RX ADMIN — HYDROCODONE BITARTRATE AND ACETAMINOPHEN 1 TABLET: 7.5; 325 TABLET ORAL at 06:30

## 2017-09-02 RX ADMIN — DORZOLAMIDE HCL 1 DROP: 20 SOLUTION/ DROPS OPHTHALMIC at 20:07

## 2017-09-02 RX ADMIN — BRIMONIDINE TARTRATE 1 DROP: 2 SOLUTION OPHTHALMIC at 20:07

## 2017-09-02 RX ADMIN — LOSARTAN POTASSIUM 100 MG: 50 TABLET, FILM COATED ORAL at 09:15

## 2017-09-02 RX ADMIN — BRIMONIDINE TARTRATE 1 DROP: 2 SOLUTION OPHTHALMIC at 09:16

## 2017-09-02 RX ADMIN — HYDROCODONE BITARTRATE AND ACETAMINOPHEN 1 TABLET: 7.5; 325 TABLET ORAL at 20:07

## 2017-09-02 RX ADMIN — FERROUS SULFATE TAB 325 MG (65 MG ELEMENTAL FE) 325 MG: 325 (65 FE) TAB at 22:57

## 2017-09-02 RX ADMIN — CLOPIDOGREL 75 MG: 75 TABLET, FILM COATED ORAL at 09:16

## 2017-09-02 RX ADMIN — HYDROCODONE BITARTRATE AND ACETAMINOPHEN 1 TABLET: 7.5; 325 TABLET ORAL at 14:00

## 2017-09-02 RX ADMIN — BRIMONIDINE TARTRATE 1 DROP: 2 SOLUTION OPHTHALMIC at 18:03

## 2017-09-02 RX ADMIN — FERROUS SULFATE TAB 325 MG (65 MG ELEMENTAL FE) 325 MG: 325 (65 FE) TAB at 09:16

## 2017-09-02 RX ADMIN — ONDANSETRON 4 MG: 2 INJECTION INTRAMUSCULAR; INTRAVENOUS at 06:30

## 2017-09-02 RX ADMIN — POTASSIUM CHLORIDE 10 MEQ: 750 CAPSULE, EXTENDED RELEASE ORAL at 09:16

## 2017-09-02 RX ADMIN — DOCUSATE SODIUM -SENNOSIDES 2 TABLET: 50; 8.6 TABLET, COATED ORAL at 22:57

## 2017-09-02 NOTE — PLAN OF CARE
Problem: Patient Care Overview (Adult)  Goal: Plan of Care Review    09/02/17 0551   Coping/Psychosocial Response Interventions   Plan Of Care Reviewed With patient   Patient Care Overview   Progress improving   Outcome Evaluation   Outcome Summary/Follow up Plan VSS, dressing CDI, pt voices adequate pain control, voiding without difficulty, educated on the importance of BP monitoring at home related to history of HTN       Goal: Adult Individualization and Mutuality  Outcome: Ongoing (interventions implemented as appropriate)    09/02/17 0551   Individualization   Patient Specific Preferences none stated   Patient Specific Goals pain control and improved mobility   Patient Specific Interventions offer PRN pain meds in a timely manner       Goal: Discharge Needs Assessment  Outcome: Ongoing (interventions implemented as appropriate)    09/01/17 0753 09/02/17 0551   Discharge Needs Assessment   Concerns To Be Addressed --  basic needs concerns   Readmission Within The Last 30 Days --  no previous admission in last 30 days   Equipment Needed After Discharge --  walker, standard;wound care supplies;raised toilet   Discharge Facility/Level Of Care Needs --  acute rehab;home with home health   Discharge Disposition --  still a patient   Current Health   Anticipated Changes Related to Illness --  inability to care for self   Living Environment   Transportation Available --  car;family or friend will provide   Self-Care   Equipment Currently Used at Home cane, straight;raised toilet --          Problem: Perioperative Period (Adult)  Goal: Signs and Symptoms of Listed Potential Problems Will be Absent or Manageable (Perioperative Period)  Outcome: Ongoing (interventions implemented as appropriate)  Goal: Signs and Symptoms of Listed Potential Problems Will be Absent or Manageable (Perioperative Period)  Outcome: Ongoing (interventions implemented as appropriate)    09/02/17 0551   Perioperative Period   Problems Assessed  (Perioperative Period) all   Problems Present (Perioperative Period) pain;situational response         Problem: Fall Risk (Adult)  Goal: Identify Related Risk Factors and Signs and Symptoms  Outcome: Ongoing (interventions implemented as appropriate)    09/02/17 0551   Fall Risk   Fall Risk: Related Risk Factors culprit medication(s);gait/mobility problems;environment unfamiliar   Fall Risk: Signs and Symptoms presence of risk factors       Goal: Absence of Falls  Outcome: Ongoing (interventions implemented as appropriate)    09/02/17 0551   Fall Risk (Adult)   Absence of Falls achieves outcome         Problem: Hip Replacement, Total (Adult)  Goal: Signs and Symptoms of Listed Potential Problems Will be Absent or Manageable (Hip Replacement, Total)  Outcome: Ongoing (interventions implemented as appropriate)    09/02/17 0551   Hip Replacement, Total   Problems Assessed (Total Hip Replacement) all   Problems Present (Total Hip Replacement) pain;functional decline/self care deficit;situational response

## 2017-09-02 NOTE — DISCHARGE SUMMARY
Orthopedic Discharge Summary      Patient: Juanis Ortega    YOB: 1940    Medical Record Number: [id number]    Attending Physician: Melania Mckay, *  Consulting Physician(s):   Date of Admission: 9/1/2017  6:14 AM  Date of Discharge:      RT TOTAL HIP ARTHROPLASTY ANTERIOR WITH HANA TABLE    Patient Active Problem List   Diagnosis   (none) - all problems resolved or deleted          Allergies   Allergen Reactions   • Hydrochlorothiazide W-Triamterene Itching     Clarified with patient and [facility]  Clarified with patient and [facility]        Juanis Ortega   Home Medication Instructions LAILA:[id number]    Printed on:09/02/17 1222   Medication Information                      albuterol (PROVENTIL HFA;VENTOLIN HFA) 108 (90 BASE) MCG/ACT inhaler  Inhale 2 puffs Every 6 (Six) Hours As Needed for wheezing or shortness of air.             albuterol (PROVENTIL) (2.5 MG/3ML) 0.083% nebulizer solution  Take 2.5 mg by nebulization Every 6 (Six) Hours As Needed for shortness of air.             amiodarone (PACERONE) 200 MG tablet  Take 200 mg by mouth Daily.             aspirin  MG EC tablet  Take 1 tablet by mouth Daily for 21 days.             bisacodyl (DULCOLAX) 5 MG EC tablet  Take 2 tablets by mouth Daily As Needed for Constipation for up to 10 days.             brimonidine (ALPHAGAN) 0.2 % ophthalmic solution  Administer 1 drop to both eyes 3 (Three) Times a Day.             cetirizine (zyrTEC) 10 MG tablet  Take 10 mg by mouth Daily.             cholecalciferol (VITAMIN D3) 1000 UNITS tablet  Take 1,000 Units by mouth Daily.             clopidogrel (PLAVIX) 75 MG tablet  Take 75 mg by mouth Daily.             dorzolamide (TRUSOPT) 2 % ophthalmic solution  Administer 1 drop to both eyes Every Night.             ferrous sulfate 325 (65 FE) MG tablet  Take 325 mg by mouth 2 (Two) Times a Day. PREOP TO BUILD UP BLOOD RESERVES/JEHOVAHS WITNESS             fluticasone-salmeterol  (ADVAIR DISKUS) 250-50 MCG/DOSE DISKUS  Inhale 1 puff 2 (Two) Times a Day.             furosemide (LASIX) 40 MG tablet  Take 40 mg by mouth Daily. AND PRN -  A SECOND DOSE DAILY FOR ADDITIONAL SWELLING             hydrALAZINE (APRESOLINE) 25 MG tablet  Take 25 mg by mouth 2 (Two) Times a Day.             HYDROcodone-acetaminophen (NORCO) 7.5-325 MG per tablet  Take 1 tablet by mouth Every 4 (Four) Hours As Needed for Moderate Pain  for up to 9 days.             losartan (COZAAR) 100 MG tablet  Take 100 mg by mouth Daily.             potassium chloride (MICRO-K) 10 MEQ CR capsule  Take 10 mEq by mouth 2 (Two) Times a Day.             saline 0.65 % nasal solution (BABY AYR) 0.65 % solution  4 sprays into each nostril As Needed.             travoprost, BAK free, (TRAVATAN Z) 0.004 % solution ophthalmic solution  Administer 1 drop to both eyes 3 (Three) Times a Day. 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   • Breast cancer     LEFT BREAST CANCER   • Cardiac murmur     PER PT   • History of epistaxis     PREVIOUSLY ON ANTICOAG, NO LONGER   • History of gastroesophageal reflux (GERD)     CURRENTLY OFF ZANTAC   • History of transfusion     IN 1975, NO REACTION, HOWEVER NOW JEHOVAHS WITNESS   • Hypertension    • Lupus    • Other bursal cyst, right hip    • Risk factors for obstructive sleep apnea    • Sarcoidosis    • Stroke    • TIA (transient ischemic attack)     X3, NO RESIDUAL PER PT        Past Surgical History:   Procedure Laterality Date   • HYSTERECTOMY     • INGUINAL HERNIA REPAIR Right    • IR CEREBRAL ANEURYSM COILING     • MASTECTOMY Left    • OOPHORECTOMY Right     FOR TUMOR        Social History     Occupational History   • Not on file.     Social History Main Topics   • Smoking status: Never Smoker   •  Smokeless tobacco: Not on file   • Alcohol use No   • Drug use: No   • Sexual activity: Defer      Social History     Social History Narrative        Family History   Problem Relation Age of Onset   • Malig Hyperthermia Neg Hx        Physical Exam: 76 y.o. female  General Appearance:    Alert, cooperative, in no acute distress                      Vitals:    09/02/17 0044 09/02/17 0318 09/02/17 0700 09/02/17 1100   BP:  140/74 118/58 128/63   BP Location:  Right arm Right arm Right arm   Patient Position:  Lying Lying Lying   Pulse: 68 61 64 52   Resp:  16 16 16   Temp:  97.9 °F (36.6 °C) 97.5 °F (36.4 °C) 98.9 °F (37.2 °C)   TempSrc:  Oral Oral Oral   SpO2: 98% 100% 96% 100%   Weight:       Height:            Hospital Course:  76 y.o. female admitted to [facility] to services of Melania Mckay, Joann with Osteoarthritis right hip  on 9/1/2017 and underwent a right total hip arthroplasty Per Melania Mckay MD. Antibiotic  Kefzol 2 gm every 8 hours and VTE prophylaxis  Aspirin 325 mg every 12 hours orally  were per SCIP protocols. Post-operatively the patient transferred to the post-operative floor where the patient underwent mobilization therapy that included active ROM exercises. Opioids were titrated to achieve appropriate pain management to allow for participation in mobilization exercises. Vital signs are now stable. The incision is intact without signs or symptoms of infection. Operative extremity neurovascular status remains intact.   Appropriate education re: incision care, activity levels, medications, hip dislocation precautions, and follow up visits was completed and all questions were answered. The patient is now deemed stable for discharge to subacute rehab as she lives alone.  She is a Christian and cell saver was used during surgery. Hemoglobin remained stable.     DIAGNOSTIC TESTS:     Admission on 09/01/2017   Component Date Value Ref Range Status   • Glucose 09/02/2017  129* 65 - 99 mg/dL Final   • BUN 09/02/2017 12  8 - 23 mg/dL Final   • Creatinine 09/02/2017 1.28* 0.57 - 1.00 mg/dL Final   • Sodium 09/02/2017 138  136 - 145 mmol/L Final   • Potassium 09/02/2017 4.2  3.5 - 5.2 mmol/L Final   • Chloride 09/02/2017 101  98 - 107 mmol/L Final   • CO2 09/02/2017 21.3* 22.0 - 29.0 mmol/L Final   • Calcium 09/02/2017 8.3* 8.6 - 10.5 mg/dL Final   • eGFR   Amer 09/02/2017 49* >60 mL/min/1.73 Final   • BUN/Creatinine Ratio 09/02/2017 9.4  7.0 - 25.0 Final   • Anion Gap 09/02/2017 15.7  mmol/L Final   • WBC 09/02/2017 12.51* 4.50 - 10.70 10*3/mm3 Final   • RBC 09/02/2017 3.67* 3.90 - 5.20 10*6/mm3 Final   • Hemoglobin 09/02/2017 10.9* 11.9 - 15.5 g/dL Final   • Hematocrit 09/02/2017 35.3* 35.6 - 45.5 % Final   • MCV 09/02/2017 96.2  80.5 - 98.2 fL Final   • MCH 09/02/2017 29.7  26.9 - 32.0 pg Final   • MCHC 09/02/2017 30.9* 32.4 - 36.3 g/dL Final   • RDW 09/02/2017 14.6* 11.7 - 13.0 % Final   • RDW-SD 09/02/2017 51.7  37.0 - 54.0 fl Final   • MPV 09/02/2017 12.6* 6.0 - 12.0 fL Final   • Platelets 09/02/2017 243  140 - 500 10*3/mm3 Final   • Neutrophil % 09/02/2017 86.5* 42.7 - 76.0 % Final   • Lymphocyte % 09/02/2017 4.2* 19.6 - 45.3 % Final   • Monocyte % 09/02/2017 8.9  5.0 - 12.0 % Final   • Eosinophil % 09/02/2017 0.0* 0.3 - 6.2 % Final   • Basophil % 09/02/2017 0.0  0.0 - 1.5 % Final   • Immature Grans % 09/02/2017 0.4  0.0 - 0.5 % Final   • Neutrophils, Absolute 09/02/2017 10.82* 1.90 - 8.10 10*3/mm3 Final   • Lymphocytes, Absolute 09/02/2017 0.53* 0.90 - 4.80 10*3/mm3 Final   • Monocytes, Absolute 09/02/2017 1.11  0.20 - 1.20 10*3/mm3 Final   • Eosinophils, Absolute 09/02/2017 0.00  0.00 - 0.70 10*3/mm3 Final   • Basophils, Absolute 09/02/2017 0.00  0.00 - 0.20 10*3/mm3 Final   • Immature Grans, Absolute 09/02/2017 0.05* 0.00 - 0.03 10*3/mm3 Final       Fl C Arm During Surgery    Result Date: 9/1/2017  Narrative: This procedure was auto-finalized with no dictation  required.    Xr Chest Pa & Lateral    Result Date: 8/31/2017  Narrative: TWO-VIEW CHEST  HISTORY: 76-year-old female with likely diagnosis atrial fibrillation, hypertension osteoarthritis and left mastectomy for breast cancer presents for preoperative evaluation prior to hip surgery.  COMPARISON: 09/14/2010  FINDINGS: 1. Question soft tissue mass superiorly right lung medially measuring up to 5.2 x 2.7 cm. 2. Cardiac enlargement previous left mastectomy. 3. No other evidence of pulmonary parenchymal abnormality.  Follow-up CT is recommended for more accurate assessment. A call is into patient's orthopedist, Dr. Mckay.      PELVIS AND RIGHT HIP 3 VIEWS  HISTORY: Chronic right hip pain  COMPARISON: None available  FINDINGS: 1. Prominent arthritic change of the right hip with axial joint space narrowing. Mild soft tissue calcification. 3. No other significant abnormality.  This report was finalized on 8/31/2017 11:13 AM by Dr. Martin Orosco MD.      Xr Hip With Or Without Pelvis 1 View Right    Result Date: 9/1/2017  Narrative: PORTABLE VIEWS OF THE RIGHT HIP  CLINICAL HISTORY: Postop arthroplasty  A single AP portable view of the right hip and an AP view the pelvis demonstrate a right total hip arthroplasty that appears in satisfactory position. The left hip joint appears slightly narrowed.  This report was finalized on 9/1/2017 11:53 AM by Dr. Fili Finley MD.      Xr Hip With Or Without Pelvis 1 View Right    Result Date: 9/1/2017  Narrative: INTRAOPERATIVE IMAGING RIGHT HIP 6 VIEWS  HISTORY: Chronic right hip pain for total right hip arthroplasty  COMPARISON: 08/31/2017  FINDINGS: 1. C-arm generated imaging and fluoroscopic guidance provided intraoperatively for localization purposes. 2. Hardware has satisfactory appearance on completion imaging.  FLUOROSCOPY TIME: 1 minute, 0 seconds, 6 images  This report was finalized on 9/1/2017 10:54 AM by Dr. Martin Orosco MD.        Discharge and Follow up Instructions:      I. ACTIVITIES:  1. Exercises:  ? Complete exercise program as taught by the hospital physical therapist 2 times per day  ? Exercise program will be advanced by the physical therapist  ? During the day be up ambulating every 2 hours (while awake) for short distances  ? Complete the ankle pump exercises at least 10 times per hour (while awake)  ? Elevate legs when in bed and for at least 30 minutes during the day.Use cold packs 20-30 minutes approximately 5 times per day. This should be done before and after completing your exercises and at any time you are experiencing pain/ stiffness in your operative extremity.      2. Activities of Daily Living:  ? No tub baths, hot tubs, or swimming pools for 4 weeks  ? May shower and let water run over the incision on post-operative day #5 if no drainage. Do not scrub or rub the incision. Simply let the water run over the incision and pat dry.    II. Restrictions  ? Continue  Anterior hip precautions as taught at the hospital  ? Your surgeon will discuss with you when you will be able to drive again. Usual guidelines are you are to be off pain medications prior to driving.  ? Weight bearing is as tolerated  ? First week stay inside on even terrain. May go up and down stairs one stair at a time utilizing the hand rail once cleared by physical therapy to do so.  ? After one week, you may venture outside (if cleared to do so by physical therapist).    III. Precautions:  ? Everyone that comes near you should wash their hands  ? No elective dental, genital-urinary, or colon procedures or surgical procedures for 12 weeks after surgery unless absolutely necessary.  ?  If dental work or surgical procedure is deemed absolutely necessary, you will need to contact your surgeon as you will need to take antibiotics 1 hour prior to any dental work (including teeth cleanings).  ? Please discuss with your surgeon prophylactic antibiotics as the length of time this intervention will be  necessary for you varies with each patient’s health history and situation.  ? Avoid sick people. If you must be around someone who is ill, they should wear a mask.  ? Avoid visits to the Emergency Room or Urgent Care. If you feel you need to go to the Emergency Room, please notify your Surgeon's office.  ? Stockings are to be worn for one week after surgery and are to be placed on in the morning and removed at night. Observe your skin when stocking is removed for any problems. Monitor the stockings to ensure that any swelling is not causing the stockings to become too tight. In this case, remove stockings immediately.      IV. INCISION CARE:  ? Wash your hands prior to dressing changes  ? Change the dressing as needed to keep incision clean and dry. Utilize dry gauze and paper tape. Avoid touching the side of the gauze that goes against the incision with your hands.  ? No creams or ointments to the incision  ? May remove dressing once the incision is free of drainage  ? Do not touch or pick at the incision  ? Check incision every day and notify surgeon immediately if any of the following signs or symptoms are noted:  o Increase in redness  o Increase in swelling around the incision and of the entire extremity  o Increase in pain  o Drainage oozing from the incision  o Pulling apart of the edges of the incision  o Increase in overall body temperature (greater than 100.5 degrees)  ?     You have absorbable sutures with steristrips, please do not remove the  steristrips for 14 days, you can shower on them 6 days after surgery.     V. Medications:   1. Anticoagulants: You will be discharged on an anticoagulant. This is a prophylactic medication that helps prevent blood clots during your post-operative period.  You will be on  Aspirin 325 mg Enteric Coated daily for  21 days. You will restart plavix.  ? While taking the anticoagulant, you should avoid taking any additional aspirin, ibuprofen (Advil or Motrin), Aleve  (Naprosyn) or other non-steroidal anti-inflammatory medications.   ? Notify surgeon immediately if any osmar bleeding is noted in the urine, stool, emesis, or from the nose or the incision. Blood in the stool will often appear as black rather than red. Blood in urine may appear as pink. Blood in emesis may appear as brown/black like coffee grounds.  ? You will need to apply pressure for longer periods of time to any cuts or abrasions to stop bleeding  ? Avoid alcohol while taking anticoagulants    2. Stool Softeners: You will be at greater risk of constipation after surgery due to being less mobile and the pain medications.   ? Take stool softeners as instructed by your surgeon while on pain medications. Over the counter Colace 100 mg 1-2 capsules twice daily.   ? If stools become too loose or too frequent, please decreases the dosage or stop the stool softener.  ? If constipation occurs despite use of stool softeners, you are to continue the stool softeners and add a laxative (Milk of Magnesia 1 ounce daily as needed).  ? Dulcolax oral tabs or suppository, or a fleets enema can also be utilized for constipation and can be obtained over the counter.   ? If above interventions are unsuccessful in inducing bowel movements, please contact your surgeon's office / family physician's office.  ? Drink plenty of fluids, and eat fruits and vegetables during your recovery time    3. Pain Medications utilized after surgery are narcotics and the law requires that the following information be given to all patients that are prescribed narcotics:  ? CLASSIFICATION: Pain medications are called Opioids and are narcotics  ? LEGALITIES: It is illegal to share narcotics with others and to drive within 24 hours of taking narcotics  ? POTENTIAL SIDE EFFECTS: Potential side effects of opioids include: nausea, vomiting, itching, dizziness, drowsiness, dry mouth, constipation, and difficulty urinating.  ? POTENTIAL ADVERSE EFFECTS:    o Opioid tolerance can develop with use of pain medications and this simply means that it requires more and more of the medication to control pain; however, this is seen more in patients that use opioids for longer periods of time.  o Opioid dependence can develop with use of Opioids and this simply means that to stop the medication can cause withdrawal symptoms; however, this is seen with patients that use Opioids for longer periods of time.  o Opioid addiction can develop with use of Opioids and the incidence of this is very unlikely in patients who take the medications as ordered and stop the medications as instructed.  o Opioid overdose can be dangerous, but is unlikely when the medication is taken as ordered and stopped when ordered. It is important not to mix opioids with alcohol or with and type of sedative such as Benadryl as this can lead to over sedation and respiratory difficulty.  ? DOSAGE:   o Pain medications will need to be taken consistently for the first week to decrease pain and promote adequate pain relief and participation in physical therapy.  o After the initial surgical pain begins to resolve, you may begin to decrease the pain medication. By the end of 6 weeks, you should be off of pain medications.  o Refills will not be given by the office during evening hours, on weekends, or after 6 weeks post-op.  o To seek refills on pain medications during the initial 6 week post-operative period, you must call the office 48 hours in advance to request the refill. The office will then notify you when to  the prescription. DO NOT wait until you are out of the medication to request a refill.    V. FOLLOW-UP VISITS:  ? You will need to follow up in the office with your surgeon in  2 weeks SEpt 20, 2017. Please call this number [phone]  to schedule this appointment.  If you have any concerns or suspected complications prior to your follow up visit, please call your surgeons office. Do not  wait until your appointment time if you suspect complications. These will need to be addressed in the office promptly.    Date: 9/2/2017    Melania Mckay MD    CC: Abbey Pollard MD; Melania Mckay MD

## 2017-09-02 NOTE — PROGRESS NOTES
"    DAILY PROGRESS NOTE  [facility]    Patient Identification:  Name: Juanis Ortega  Age: 76 y.o.  Sex: female  :  1940  MRN: [id number]         Primary Care Physician: Abbey Pollard MD    Subjective:  Interval History: nausea improving - no cp/sob/loc/lof    Objective:no fm - d/w rn     Scheduled Meds:  amiodarone 200 mg Oral Daily   aspirin 325 mg Oral Daily   brimonidine 1 drop Both Eyes TID   budesonide-formoterol 2 puff Inhalation BID   clopidogrel 75 mg Oral Daily   dorzolamide 1 drop Both Eyes Nightly   ferrous sulfate 325 mg Oral BID   hydrALAZINE 25 mg Oral BID   losartan 100 mg Oral Daily   potassium chloride 10 mEq Oral BID   sennosides-docusate sodium 2 tablet Oral BID     Continuous Infusions:     Vital signs in last 24 hours:  Temp:  [96.8 °F (36 °C)-97.9 °F (36.6 °C)] 97.5 °F (36.4 °C)  Heart Rate:  [] 64  Resp:  [16-20] 16  BP: (118-168)/(56-86) 118/58    Intake/Output:    Intake/Output Summary (Last 24 hours) at 17 0925  Last data filed at 17 0630   Gross per 24 hour   Intake             1900 ml   Output              850 ml   Net             1050 ml       Exam:  /58 (BP Location: Right arm, Patient Position: Lying)  Pulse 64  Temp 97.5 °F (36.4 °C) (Oral)   Resp 16  Ht 61\" (154.9 cm)  Wt 186 lb 7 oz (84.6 kg)  SpO2 96%  BMI 35.23 kg/m2    General Appearance:    Alert, cooperative, no distress, AAOx3, looks pretty good clinically                        Throat:   Lips, tongue, gums normal; oral mucosa pink and moist                           Neck:   Supple, no JVD                         Lungs:    Clear to auscultation bilaterally, respirations unlabored                          Heart:    Regular rate and rhythm, S1 and S2 normal                  Abdomen:     Soft, non-tender, bowel sounds active                 Extremities:   SCDs, no cyanosis or edema                        Pulses:   Pulses palpable in lower extremities                        "         Data Review:  Labs in chart were reviewed.    Assessment:  Active Hospital Problems (** Indicates Principal Problem)    Diagnosis Date Noted   • **Primary osteoarthritis of right hip [M16.11] 08/30/2017   • Osteoarthritis of one hip [M16.10] 09/01/2017      Resolved Hospital Problems    Diagnosis Date Noted Date Resolved   No resolved problems to display.       Plan:  AFib - RC - not anticoagulated prior to admission - on Plavix     HTN - parameters adjusted - resume lasix in am    CKD3 - stable     Nausea - resolving    POD1 R-TASNEEM - DVT prophylaxis per ortho w/ [facility]. Counseled IS and importance     Thomas Cline MD  9/2/2017  9:25 AM

## 2017-09-02 NOTE — DISCHARGE PLACEMENT REQUEST
"Juanis Ortega (76 y.o. Female)     Date of Birth Social Security Number Address Home Phone MRN    1940  [facility]ly [facility] [address] [phone] [id number]    Holiness Marital Status          Orthodoxy        Admission Date Admission Type Admitting Provider Attending Provider Department, Room/Bed    9/1/17 Elective Melania Mckay MD Yakkanti, Madhusudhan R, MD [address], P895/1    Discharge Date Discharge Disposition Discharge Destination         Skilled Nursing Facility (DC - External)             Attending Provider: Melania Mckay MD     Allergies:  Hydrochlorothiazide W-triamterene    Isolation:  None   Infection:  None   Code Status:  FULL    Ht:  61\" (154.9 cm)   Wt:  186 lb 7 oz (84.6 kg)    Admission Cmt:  None   Principal Problem:  Primary osteoarthritis of right hip [M16.11]                 Active Insurance as of 9/1/2017     Primary Coverage     Payor Plan Insurance Group Employer/Plan Group    MEDICARE MEDICARE A & B      Payor Plan Address Payor Plan Phone Number Effective From Effective To    [address] [phone] 9/1/2007     [place] [address]       Subscriber Name Subscriber Birth Date Member ID       JUANIS ORTEGA 1940 [id number]                 Emergency Contacts      (Rel.) Home Phone Work Phone Mobile Phone    Lenny Ortega (Spouse) [phone] -- [phone]    Ritu Hawthornelyn (Daughter) -- -- [phone]              "

## 2017-09-02 NOTE — PROGRESS NOTES
Orthopedic Progress Note      Patient: Juanis Ortega  YOB: 1940     Date of Admission: 9/1/2017  6:14 AM Medical Record Number: [id number]     Attending Physician: Melania Mckay, *    Status Post:   RT TOTAL HIP ARTHROPLASTY ANTERIOR WITH HANA TABLE Post Operative Day Number: 1    Subjective : No new orthopaedic complaints     Pain Relief: some relief with present medication.     Systemic Complaints: No Complaints, nausea resolving  Vitals:    09/01/17 2355 09/02/17 0044 09/02/17 0318 09/02/17 0700   BP:   140/74 118/58   BP Location:   Right arm Right arm   Patient Position:   Lying Lying   Pulse: 66 68 61 64   Resp:   16 16   Temp:   97.9 °F (36.6 °C) 97.5 °F (36.4 °C)   TempSrc:   Oral Oral   SpO2:  98% 100% 96%   Weight:       Height:           Physical Exam: 76 y.o. female    General Appearance:       Alert, cooperative, in no acute distress                  Extremities:    Dressing Clean, Dry and Intact         Incision healthy without signs or symptoms of infections         No clinical sign of DVT        Able to do good movements of digits    Pulses:   Pulses palpable and equal bilaterally           Diagnostic Tests:       Results from last 7 days  Lab Units 09/02/17  0614 08/31/17  0824   WBC 10*3/mm3 12.51* 3.81*   HEMOGLOBIN g/dL 10.9* 13.0   HEMATOCRIT % 35.3* 41.7   PLATELETS 10*3/mm3 243 289       Results from last 7 days  Lab Units 09/02/17  0333 08/31/17  0824   SODIUM mmol/L 138 144   POTASSIUM mmol/L 4.2 3.9   CHLORIDE mmol/L 101 102   CO2 mmol/L 21.3* 31.1*   BUN mg/dL 12 12   CREATININE mg/dL 1.28* 1.51*   GLUCOSE mg/dL 129* 92   CALCIUM mg/dL 8.3* 9.6       Results from last 7 days  Lab Units 08/31/17  0824   INR  1.01   APTT seconds 29.9     No results found for: CRYSTAL]  No results found for: CULTURE]  No results found for: URICACID]  Fl C Arm During Surgery    Result Date: 9/1/2017  Narrative: This procedure was auto-finalized with no dictation required.    Xr Chest Pa  & Lateral    Result Date: 8/31/2017  Narrative: TWO-VIEW CHEST  HISTORY: 76-year-old female with likely diagnosis atrial fibrillation, hypertension osteoarthritis and left mastectomy for breast cancer presents for preoperative evaluation prior to hip surgery.  COMPARISON: 09/14/2010  FINDINGS: 1. Question soft tissue mass superiorly right lung medially measuring up to 5.2 x 2.7 cm. 2. Cardiac enlargement previous left mastectomy. 3. No other evidence of pulmonary parenchymal abnormality.  Follow-up CT is recommended for more accurate assessment. A call is into patient's orthopedist, Dr. Mckay.      PELVIS AND RIGHT HIP 3 VIEWS  HISTORY: Chronic right hip pain  COMPARISON: None available  FINDINGS: 1. Prominent arthritic change of the right hip with axial joint space narrowing. Mild soft tissue calcification. 3. No other significant abnormality.  This report was finalized on 8/31/2017 11:13 AM by Dr. Martin Orosco MD.      Xr Hip With Or Without Pelvis 1 View Right    Result Date: 9/1/2017  Narrative: PORTABLE VIEWS OF THE RIGHT HIP  CLINICAL HISTORY: Postop arthroplasty  A single AP portable view of the right hip and an AP view the pelvis demonstrate a right total hip arthroplasty that appears in satisfactory position. The left hip joint appears slightly narrowed.  This report was finalized on 9/1/2017 11:53 AM by Dr. Fili Finley MD.      Xr Hip With Or Without Pelvis 1 View Right    Result Date: 9/1/2017  Narrative: INTRAOPERATIVE IMAGING RIGHT HIP 6 VIEWS  HISTORY: Chronic right hip pain for total right hip arthroplasty  COMPARISON: 08/31/2017  FINDINGS: 1. C-arm generated imaging and fluoroscopic guidance provided intraoperatively for localization purposes. 2. Hardware has satisfactory appearance on completion imaging.  FLUOROSCOPY TIME: 1 minute, 0 seconds, 6 images  This report was finalized on 9/1/2017 10:54 AM by Dr. Martin Orosco MD.          Current Medications:  Scheduled Meds:  amiodarone 200 mg  Oral Daily   aspirin 325 mg Oral Daily   brimonidine 1 drop Both Eyes TID   budesonide-formoterol 2 puff Inhalation BID   clopidogrel 75 mg Oral Daily   dorzolamide 1 drop Both Eyes Nightly   ferrous sulfate 325 mg Oral BID   [START ON 9/3/2017] furosemide 40 mg Oral Daily   hydrALAZINE 25 mg Oral BID   losartan 100 mg Oral Daily   potassium chloride 10 mEq Oral BID   sennosides-docusate sodium 2 tablet Oral BID     Continuous Infusions:   PRN Meds:.•  acetaminophen  •  albuterol  •  bisacodyl  •  bisacodyl  •  docusate sodium  •  HYDROcodone-acetaminophen  •  Morphine **AND** naloxone  •  ondansetron  •  ondansetron ODT    Assessment: Status post   RT TOTAL HIP ARTHROPLASTY ANTERIOR WITH HANA TABLE    Patient Active Problem List   Diagnosis   • Primary osteoarthritis of right hip   • Osteoarthritis of one hip       PLAN:   Continues current post-op course  Anticoagulation: Aspirin started   Dressing Change in am  Mobilize with PT as tolerated per protocol    Weight Bearing: WBAT  Discharge Plan: OK to plan for discharge   tomorrow to SNF  from orthopadic perspective.    Melania Mckay MD    Date: 9/2/2017    Time: 11:03 AM

## 2017-09-02 NOTE — PLAN OF CARE
Problem: Patient Care Overview (Adult)  Goal: Plan of Care Review  Outcome: Ongoing (interventions implemented as appropriate)    09/02/17 0551 09/02/17 1648   Coping/Psychosocial Response Interventions   Plan Of Care Reviewed With patient --    Patient Care Overview   Progress --  progress towards functional goals is fair   Outcome Evaluation   Outcome Summary/Follow up Plan --  Pt POD1 right TASNEEM. Ambulated with PT and staff. Pain controlled with PO meds. Will continue to monitor heart rate r/t history of a fib.       Goal: Adult Individualization and Mutuality  Outcome: Ongoing (interventions implemented as appropriate)  Goal: Discharge Needs Assessment  Outcome: Ongoing (interventions implemented as appropriate)    Problem: Perioperative Period (Adult)  Goal: Signs and Symptoms of Listed Potential Problems Will be Absent or Manageable (Perioperative Period)  Outcome: Ongoing (interventions implemented as appropriate)  Goal: Signs and Symptoms of Listed Potential Problems Will be Absent or Manageable (Perioperative Period)  Outcome: Ongoing (interventions implemented as appropriate)    Problem: Fall Risk (Adult)  Goal: Identify Related Risk Factors and Signs and Symptoms  Outcome: Ongoing (interventions implemented as appropriate)  Goal: Absence of Falls  Outcome: Ongoing (interventions implemented as appropriate)    Problem: Hip Replacement, Total (Adult)  Goal: Signs and Symptoms of Listed Potential Problems Will be Absent or Manageable (Hip Replacement, Total)  Outcome: Ongoing (interventions implemented as appropriate)

## 2017-09-02 NOTE — THERAPY TREATMENT NOTE
Acute Care - Physical Therapy Treatment Note  [facility]     Patient Name: Juanis Ortega  : 1940  MRN: [id number]  Today's Date: 2017  Onset of Illness/Injury or Date of Surgery Date: 17  Date of Referral to PT: 17  Referring Physician: Nely    Admit Date: 2017    Visit Dx:    ICD-10-CM ICD-9-CM   1. Difficulty walking R26.2 719.7     Patient Active Problem List   Diagnosis   (none) - all problems resolved or deleted               Adult Rehabilitation Note       17 1300 17 0930       Rehab Assessment/Intervention    Discipline physical therapist  -JORGE physical therapist  -JORGE     Document Type therapy note (daily note)  -JORGE therapy note (daily note)  -JORGE     Subjective Information agree to therapy  -JORGE agree to therapy  -JORGE     Patient Effort, Rehab Treatment good  -JORGE good  -JORGE     Recorded by [JORGE] Sharona Post, PT [JORGE] Sharona Post PT     Pain Assessment    Pain Assessment 0-10  -JORGE 0-10  -JORGE     Pain Score 6  -JORGE 6  -JORGE     Pain Location Hip  -JORGE Hip  -JORGE     Pain Orientation Right  -JORGE Right  -JORGE     Pain Intervention(s) Medication (See MAR);Repositioned  -JORGE Medication (See MAR);Repositioned  -JORGE     Recorded by [JORGE] Sharona Post, PT [JORGE] Sharona Post PT     Cognitive Assessment/Intervention    Current Cognitive/Communication Assessment functional  -JORGE functional  -JORGE     Orientation Status oriented x 4  -JORGE oriented x 4  -JORGE     Personal Safety WNL/WFL  -JORGE WNL/WFL  -JORGE     Personal Safety Interventions fall prevention program maintained;gait belt;muscle strengthening facilitated;nonskid shoes/slippers when out of bed  -JORGE fall prevention program maintained;gait belt;muscle strengthening facilitated;nonskid shoes/slippers when out of bed  -JORGE     Recorded by [JORGE] Sharona Post, PT [JORGE] Sharona Post PT     Transfer Assessment/Treatment    Transfers, Sit-Stand [place] minimum assist (75% patient effort);contact guard assist;verbal cues  required  -JORGE minimum assist (75% patient effort);contact guard assist;verbal cues required  -JORGE     Transfers, Stand-Sit [place] minimum assist (75% patient effort);contact guard assist;verbal cues required  -JORGE minimum assist (75% patient effort);contact guard assist;verbal cues required  -JORGE     Recorded by [JORGE] Sharona Post PT [JORGE] Sharona Post, PT     Gait Assessment/Treatment    Gait, [place] Level minimum assist (75% patient effort);contact guard assist;verbal cues required  -JORGE minimum assist (75% patient effort);contact guard assist;verbal cues required  -JORGE     Gait, Assistive Device rolling walker  -JORGE rolling walker  -JORGE     Gait, Distance (Feet) 50  -JORGE 40  -JORGE     Gait, Gait Deviations antalgic;sujey decreased;forward flexed posture  -JORGE antalgic;sujey decreased;forward flexed posture  -JORGE     Recorded by [JORGE] Sharona Post PT [JORGE] Sharona Post PT     Therapy Exercises    Exercise Protocols total hip  -JORGE total hip  -JORGE     Total Hip Exercises completed protocol;20 reps  -JORGE 15 reps;completed protocol  -JORGE     Recorded by [JORGE] Sharona Post PT [JORGE] Sharona Post PT     Positioning and Restraints    Pre-Treatment Position sitting in chair/recliner  -JORGE sitting in chair/recliner  -JORGE     Post Treatment Position chair  -JORGE chair  -JORGE     In Chair reclined;call light within reach  -JORGE reclined;call light within reach  -JORGE     Recorded by [JORGE] Sharona Post, PT [JORGE] Sharona Post PT       User Key  (r) = Recorded By, (t) = Taken By, (c) = Cosigned By    Initials Name Effective Dates    JORGE Post, PT 10/06/15 -                 IP PT Goals       09/01/17 1529          Bed Mobility PT LTG    Bed Mobility PT LTG, Date Established 09/01/17  -PC      Bed Mobility PT LTG, Time to Achieve 1 wk  -PC      Bed Mobility PT LTG, Activity Type supine to sit/sit to supine  -PC      Bed Mobility PT LTG, [place] Level minimum assist (75% patient effort)  -       Transfer Training PT LTG    Transfer Training PT LTG, Date Established 09/01/17  -PC      Transfer Training PT LTG, Time to Achieve 1 wk  -PC      Transfer Training PT LTG, Activity Type sit to stand/stand to sit  -PC      Transfer Training PT LTG, [place] Level contact guard assist  -PC      Gait Training PT LTG    Gait Training Goal PT LTG, Date Established 09/01/17  -PC      Gait Training Goal PT LTG, Time to Achieve 1 wk  -PC      Gait Training Goal PT LTG, [place] Level contact guard assist  -PC      Gait Training Goal PT LTG, Assist Device walker, rolling  -PC      Gait Training Goal PT LTG, Distance to Achieve 50 ft  -PC      Stair Training PT LTG    Stair Training Goal PT LTG, Date Established 09/01/17  -PC      Stair Training Goal PT LTG, Time to Achieve 1 wk  -PC      Stair Training Goal PT LTG, Number of Steps 4  -PC      Stair Training Goal PT LTG, [place] Level minimum assist (75% patient effort)  -PC      Stair Training Goal PT LTG, Assist Device 1 handrail  -PC        User Key  (r) = Recorded By, (t) = Taken By, (c) = Cosigned By    Initials Name Provider Type    ONEL Garzon, PT Physical Therapist          Physical Therapy Education     Title: PT OT SLP Therapies (Active)     Topic: Physical Therapy (Active)     Point: Mobility training (Done)    Learning Progress Summary    Learner Readiness Method Response Comment Documented by Status   Patient Acceptance E ANIL MOORE 09/02/17 1717 Done    Acceptance E,D NR   09/01/17 1529 Active               Point: Home exercise program (Done)    Learning Progress Summary    Learner Readiness Method Response Comment Documented by Status   Patient Acceptance E ANIL MOORE 09/02/17 1717 Done    Acceptance E,D NR   09/01/17 1529 Active               Point: Body mechanics (Active)    Learning Progress Summary    Learner Readiness Method Response Comment Documented by Status   Patient Acceptance E,D NR   09/01/17 1529 Active               Point:  Precautions (Active)    Learning Progress Summary    Learner Readiness Method Response Comment Documented by Status   Patient Acceptance E,D NR  PC 09/01/17 1529 Active                      User Key     Initials Effective Dates Name Provider Type Discipline    JORGE 10/06/15 -  Sharona Post, PT Physical Therapist PT    PC 12/01/15 -  Leona Garzon, PT Physical Therapist PT                    PT Recommendation and Plan  Anticipated Equipment Needs At Discharge: front wheeled walker, bedside commode  Anticipated Discharge Disposition: home with assist, home with home health  Planned Therapy Interventions: bed mobility training, gait training, transfer training, strengthening, ROM (Range of Motion)  PT Frequency: 2 times/day  Plan of Care Review  Plan Of Care Reviewed With: patient  Progress: progress toward functional goals as expected          Outcome Measures       09/02/17 1700 09/01/17 1500       How much help from another person do you currently need...    Turning from your back to your side while in flat bed without using bedrails? 3  -JORGE 2  -PC     Moving from lying on back to sitting on the side of a flat bed without bedrails? 3  -JORGE 2  -PC     Moving to and from a bed to a chair (including a wheelchair)? 3  -JORGE 2  -PC     Standing up from a chair using your arms (e.g., wheelchair, bedside chair)? 3  -JORGE 2  -PC     Climbing 3-5 steps with a railing? 3  -JORGE 1  -PC     To walk in hospital room? 3  -JORGE 2  -PC     AM-PAC 6 Clicks Score 18  -JORGE 11  -PC     Functional Assessment    Outcome Measure Options AM-PAC 6 Clicks Basic Mobility (PT)  -JORGE AM-PAC 6 Clicks Basic Mobility (PT)  -PC       User Key  (r) = Recorded By, (t) = Taken By, (c) = Cosigned By    Initials Name Provider Type    JORGE Post, PT Physical Therapist    PC Leona Garzon, PT Physical Therapist           Time Calculation:         PT Charges       09/02/17 1720 09/02/17 1718       Time Calculation    Start Time 1300  -JORGE 0930  -JORGE      Stop Time 1400  -JORGE 1030  -JORGE     Time Calculation (min) 60 min  -JORGE 60 min  -JORGE     PT Received On 09/02/17  -JORGE 09/02/17  -JORGE       User Key  (r) = Recorded By, (t) = Taken By, (c) = Cosigned By    Initials Name Provider Type    JORGE Post, PT Physical Therapist          Therapy Charges for Today     Code Description Service Date Service Provider Modifiers Qty    81284868839 HC PT THER PROC GROUP 9/2/2017 Sharona Post, PT GP 1    39202681717 HC PT THER PROC EA 15 MIN 9/2/2017 Sharona Post, PT GP 1    97976509130 HC PT THER PROC GROUP 9/2/2017 Sharona Post, PT GP 1    98734662626 HC PT THER PROC EA 15 MIN 9/2/2017 Sharona Post, PT GP 1          PT G-Codes  Outcome Measure Options: AM-PAC 6 Clicks Basic Mobility (PT)    Sharona Post PT  9/2/2017

## 2017-09-02 NOTE — PROGRESS NOTES
Discharge Planning Assessment  [facility]     Patient Name: Juanis Ortega  MRN: [id number]  Today's Date: 9/2/2017    Admit Date: 9/1/2017          Discharge Needs Assessment       09/02/17 1503    Living Environment    Lives With spouse    Living Arrangements house    Home Accessibility bed and bath on same level;stairs to enter home;stairs within home    Number of Stairs to Enter Home 5    Number of Stairs Within Home 24    Stair Railings at Home outside, present at both sides;inside, present at both sides    Type of Financial/Environmental Concern none    Transportation Available car;family or friend will provide    Living Environment    Provides Primary Care For no one, unable/limited ability to care for self    Primary Care Provided By spouse/significant other    Quality Of Family Relationships supportive    Able to Return to Prior Living Arrangements no    Living Arrangement Comments anticipates dc to skilled care bed at [facility] before returning to home.     Discharge Needs Assessment    Concerns To Be Addressed adjustment to diagnosis/illness concerns;discharge planning concerns    Readmission Within The Last 30 Days no previous admission in last 30 days    Community Agency Name(S) denies previous use of HH or SNF    Anticipated Changes Related to Illness inability to care for self    Equipment Currently Used at Home cane, straight;raised toilet    Equipment Needed After Discharge walker, standard   walker to be arranged by SNF at time of DC from facility    Discharge Facility/Level Of Care Needs nursing facility, skilled    Current Discharge Risk physical impairment    Discharge Disposition still a patient            Discharge Plan       09/02/17 1505    Case Management/Social Work Plan    Plan DC via private auto to skilled care--[facility]. Bed available on Monday at [facility]    Patient/Family In Agreement With Plan yes    Additional Comments Introduced  "self/explained role of CCP. Face sheet data confirmed. IMM letter checked. Pt states she lives at home with her spouse and adult daughter Susan. Confirmed PCP and pharmacy. Denies difficulty affording her meds. 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 States her spouse picks up her meds. Pt states she uses a cane at home. Denies previous use of HH or SNF. Requested referrals to [facility]. Spoke with Elizabeth/Courtney Shipley ([phone]) and she states bed available Sunday or Monday for skilled care and she requested clinicals be faxed to her at [phone]. Email to [facility] to check bed availability. Referrals to both facilities sent via [facility] [employer]. Updated pt and staff RN. Pt states her family will be able to drive her to SNF at time of DC. CCP to follow.................JW        Discharge Placement     Facility/Agency Request Status Selected? Address Phone Number Fax Number    [facility]     [address] [address] [phone]        Alka Lopes, RN 9/2/2017 15:12    Spoke with Elizabeth who states skilled care bed will be available on Sunday or Monday. Clinicals faxed to her at [phone]                [facility] Pending - Request Sent     [address] [address] [phone]        Alka Lopes, RN 9/2/2017 14:41    Email referral to [facility]/referral sent via [facility] [employer]                   Expected Discharge Date and Time     Expected Discharge Date Expected Discharge Time    Sep 3, 2017               Demographic Summary       09/02/17 1500    Referral Information    Admission Type inpatient    Arrived From admitted as an inpatient;home or self-care    Referral Source admission list;physician   CCP consult noted    Reason For Consult discharge planning    Record Reviewed medical record    Contact Information    Permission Granted to Share " Information With ;family/designee            Functional Status       09/02/17 1501    Functional Status Current    Ambulation 3-->assistive equipment and person    Transferring 3-->assistive equipment and person    Toileting 3-->assistive equipment and person    Bathing 2-->assistive person    Dressing 2-->assistive person    Eating 0-->independent    Communication 0-->understands/communicates without difficulty    Swallowing (if score 2 or more for any item, consult Rehab Services) 0-->swallows foods/liquids without difficulty    Current Functional Level Comment pt encouraged to call for staff assist for needs    Change in Functional Status Since Onset of Current Illness/Injury yes    Functional Status Prior    Ambulation 1-->assistive equipment    Transferring 1-->assistive equipment    Toileting 0-->independent    Bathing 0-->independent    Dressing 0-->independent    Eating 0-->independent    Communication 0-->understands/communicates without difficulty    Swallowing 0-->swallows foods/liquids without difficulty    IADL    Medications assistive person   states she does not drive, her spouse picks up her meds    Meal Preparation independent    Housekeeping independent    Laundry independent    Shopping independent    Oral Care independent    Activity Tolerance    Current Activity Limitations other (see comments)   general post op weakness    Usual Activity Tolerance good    Current Activity Tolerance fair    Cognitive/Perceptual/Developmental    Current Mental Status/Cognitive Functioning no deficits noted            Psychosocial     None            Abuse/Neglect     None            Legal     None            Substance Abuse     None            Patient Forms     None          Alka Lopes, RN

## 2017-09-02 NOTE — PLAN OF CARE
Problem: Patient Care Overview (Adult)  Goal: Plan of Care Review  Outcome: Ongoing (interventions implemented as appropriate)    09/02/17 0134   Coping/Psychosocial Response Interventions   Plan Of Care Reviewed With patient   Patient Care Overview   Progress progress toward functional goals as expected

## 2017-09-02 NOTE — THERAPY TREATMENT NOTE
Acute Care - Physical Therapy Treatment Note  [facility]     Patient Name: Juanis Ortega  : 1940  MRN: [id number]  Today's Date: 2017  Onset of Illness/Injury or Date of Surgery Date: 17  Date of Referral to PT: 17  Referring Physician: Nely    Admit Date: 2017    Visit Dx:    ICD-10-CM ICD-9-CM   1. Difficulty walking R26.2 719.7     Patient Active Problem List   Diagnosis   (none) - all problems resolved or deleted               Adult Rehabilitation Note       17 0930          Rehab Assessment/Intervention    Discipline physical therapist  -JORGE      Document Type therapy note (daily note)  -JORGE      Subjective Information agree to therapy  -JORGE      Patient Effort, Rehab Treatment good  -JORGE      Recorded by [JORGE] Sharona Post, PT      Pain Assessment    Pain Assessment 0-10  -JORGE      Pain Score 6  -JORGE      Pain Location Hip  -JORGE      Pain Orientation Right  -JORGE      Pain Intervention(s) Medication (See MAR);Repositioned  -JORGE      Recorded by [JORGE] Sharona Post, PT      Cognitive Assessment/Intervention    Current Cognitive/Communication Assessment functional  -JORGE      Orientation Status oriented x 4  -JORGE      Personal Safety WNL/WFL  -JORGE      Personal Safety Interventions fall prevention program maintained;gait belt;muscle strengthening facilitated;nonskid shoes/slippers when out of bed  -JORGE      Recorded by [JORGE] Sharona Post, PT      Transfer Assessment/Treatment    Transfers, Sit-Stand [place] minimum assist (75% patient effort);contact guard assist;verbal cues required  -JORGE      Transfers, Stand-Sit [place] minimum assist (75% patient effort);contact guard assist;verbal cues required  -JORGE      Recorded by [JORGE] Sharona Post PT      Gait Assessment/Treatment    Gait, [place] Level minimum assist (75% patient effort);contact guard assist;verbal cues required  -JORGE      Gait, Assistive Device rolling walker  -JORGE      Gait, Distance (Feet) 40  -JORGE       Gait, Gait Deviations antalgic;sujey decreased;forward flexed posture  -JORGE      Recorded by [JORGE] Sharona Post, PT      Therapy Exercises    Exercise Protocols total hip  -JORGE      Total Hip Exercises 15 reps;completed protocol  -JORGE      Recorded by [JORGE] Sharona Post PT      Positioning and Restraints    Pre-Treatment Position sitting in chair/recliner  -JORGE      Post Treatment Position chair  -JORGE      In Chair reclined;call light within reach  -JORGE      Recorded by [JORGE] Sharona Post, PT        User Key  (r) = Recorded By, (t) = Taken By, (c) = Cosigned By    Initials Name Effective Dates    JORGE Post, PT 10/06/15 -                 IP PT Goals       09/01/17 1529          Bed Mobility PT LTG    Bed Mobility PT LTG, Date Established 09/01/17  -PC      Bed Mobility PT LTG, Time to Achieve 1 wk  -PC      Bed Mobility PT LTG, Activity Type supine to sit/sit to supine  -PC      Bed Mobility PT LTG, [place] Level minimum assist (75% patient effort)  -PC      Transfer Training PT LTG    Transfer Training PT LTG, Date Established 09/01/17  -PC      Transfer Training PT LTG, Time to Achieve 1 wk  -PC      Transfer Training PT LTG, Activity Type sit to stand/stand to sit  -PC      Transfer Training PT LTG, [place] Level contact guard assist  -PC      Gait Training PT LTG    Gait Training Goal PT LTG, Date Established 09/01/17  -PC      Gait Training Goal PT LTG, Time to Achieve 1 wk  -PC      Gait Training Goal PT LTG, [place] Level contact guard assist  -PC      Gait Training Goal PT LTG, Assist Device walker, rolling  -PC      Gait Training Goal PT LTG, Distance to Achieve 50 ft  -PC      Stair Training PT LTG    Stair Training Goal PT LTG, Date Established 09/01/17  -PC      Stair Training Goal PT LTG, Time to Achieve 1 wk  -PC      Stair Training Goal PT LTG, Number of Steps 4  -PC      Stair Training Goal PT LTG, [place] Level minimum assist (75% patient effort)  -PC      Stair  Training Goal PT LTG, Assist Device 1 handrail  -PC        User Key  (r) = Recorded By, (t) = Taken By, (c) = Cosigned By    Initials Name Provider Type    ONEL Garzon, PT Physical Therapist          Physical Therapy Education     Title: PT OT SLP Therapies (Active)     Topic: Physical Therapy (Active)     Point: Mobility training (Done)    Learning Progress Summary    Learner Readiness Method Response Comment Documented by Status   Patient Acceptance E VU,NR  JORGE 09/02/17 1717 Done    Acceptance E,D NR   09/01/17 1529 Active               Point: Home exercise program (Done)    Learning Progress Summary    Learner Readiness Method Response Comment Documented by Status   Patient Acceptance E VU,NR  JORGE 09/02/17 1717 Done    Acceptance E,D NR   09/01/17 1529 Active               Point: Body mechanics (Active)    Learning Progress Summary    Learner Readiness Method Response Comment Documented by Status   Patient Acceptance E,D NR   09/01/17 1529 Active               Point: Precautions (Active)    Learning Progress Summary    Learner Readiness Method Response Comment Documented by Status   Patient Acceptance E,D NR   09/01/17 1529 Active                      User Key     Initials Effective Dates Name Provider Type Discipline     10/06/15 -  Sharona Post, PT Physical Therapist PT     12/01/15 -  Leona Garzon, PT Physical Therapist PT                    PT Recommendation and Plan  Anticipated Equipment Needs At Discharge: front wheeled walker, bedside commode  Anticipated Discharge Disposition: home with assist, home with home health  Planned Therapy Interventions: bed mobility training, gait training, transfer training, strengthening, ROM (Range of Motion)  PT Frequency: 2 times/day  Plan of Care Review  Plan Of Care Reviewed With: patient  Progress: progress toward functional goals as expected          Outcome Measures       09/02/17 1700 09/01/17 1500       How much help from another person do  you currently need...    Turning from your back to your side while in flat bed without using bedrails? 3  -JORGE 2  -PC     Moving from lying on back to sitting on the side of a flat bed without bedrails? 3  -JORGE 2  -PC     Moving to and from a bed to a chair (including a wheelchair)? 3  -JORGE 2  -PC     Standing up from a chair using your arms (e.g., wheelchair, bedside chair)? 3  -JORGE 2  -PC     Climbing 3-5 steps with a railing? 3  -JORGE 1  -PC     To walk in hospital room? 3  -JORGE 2  -PC     AM-PAC 6 Clicks Score 18  -JORGE 11  -PC     Functional Assessment    Outcome Measure Options AM-PAC 6 Clicks Basic Mobility (PT)  -JORGE AM-PAC 6 Clicks Basic Mobility (PT)  -PC       User Key  (r) = Recorded By, (t) = Taken By, (c) = Cosigned By    Initials Name Provider Type    JORGE Post, PT Physical Therapist    PC Leona Garzon, PT Physical Therapist           Time Calculation:         PT Charges       09/02/17 1718          Time Calculation    Start Time 0930  -JORGE      Stop Time 1030  -JORGE      Time Calculation (min) 60 min  -JORGE      PT Received On 09/02/17  -JORGE        User Key  (r) = Recorded By, (t) = Taken By, (c) = Cosigned By    Initials Name Provider Type    JORGE Post, PT Physical Therapist          Therapy Charges for Today     Code Description Service Date Service Provider Modifiers Qty    58236177618 HC PT THER PROC GROUP 9/2/2017 Sharona Post, PT GP 1    49442978216 HC PT THER PROC EA 15 MIN 9/2/2017 Sharona Post, PT GP 1          PT G-Codes  Outcome Measure Options: AM-PAC 6 Clicks Basic Mobility (PT)    Sharona Post PT  9/2/2017

## 2017-09-02 NOTE — PROGRESS NOTES
Discharge Planning Assessment  [facility]     Patient Name: Juanis Ortega  MRN: [id number]  Today's Date: 9/2/2017    Admit Date: 9/1/2017          Discharge Needs Assessment       09/02/17 1503    Living Environment    Lives With spouse    Living Arrangements house    Home Accessibility bed and bath on same level;stairs to enter home;stairs within home    Number of Stairs to Enter Home 5    Number of Stairs Within Home 24    Stair Railings at Home outside, present at both sides;inside, present at both sides    Type of Financial/Environmental Concern none    Transportation Available car;family or friend will provide    Living Environment    Provides Primary Care For no one, unable/limited ability to care for self    Primary Care Provided By spouse/significant other    Quality Of Family Relationships supportive    Able to Return to Prior Living Arrangements no    Living Arrangement Comments anticipates dc to [facility] care bed at [facility] before returning to home.     Discharge Needs Assessment    Concerns To Be Addressed adjustment to diagnosis/illness concerns;discharge planning concerns    Readmission Within The Last 30 Days no previous admission in last 30 days    Community Agency Name(S) denies previous use of HH or SNF    Anticipated Changes Related to Illness inability to care for self    Equipment Currently Used at Home cane, straight;raised toilet    Equipment Needed After Discharge walker, standard    Discharge Facility/Level Of Care Needs nursing facility, skilled    Current Discharge Risk physical impairment    Discharge Disposition still a patient            Discharge Plan       09/02/17 1505    Case Management/Social Work Plan    Plan DC via private auto to skilled care--referrals to [facility] and [facility]. Bed available on Monday at [facility]    Patient/Family In Agreement With Plan yes    Additional Comments Introduced self/explained role of CCP. Face sheet data confirmed. IMM  "letter checked. Pt states she lives at home with her spouse and adult daughter Susan. Confirmed PCP and pharmacy. Denies difficulty affording her meds. 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 States her spouse picks up her meds. Pt states she uses a cane at home. Denies previous use of HH or SNF. Requested referrals to [facility] and [facility]. Spoke with Elizabeth/Courtney Shipley ([phone]) and she states bed available Sunday or Monday for skilled care and she requested clinicals be faxed to her at [phone]. Email to [facility] to check bed availability. Referrals to both facilities sent via [employer] In Basket. Updated pt and staff RN. Pt states her family will be able to drive her to SNF at time of DC. CCP to follow.................JW        Discharge Placement     Facility/Agency Request Status Selected? Address Phone Number Fax Number    [facility] Accepted     [address] [place] [address] [phone]        Alka Lopes, RN 9/2/2017 15:12    Spoke with Elizabeth who states skilled care bed will be available on Sunday or Monday. Clinicals faxed to her at [phone]                [facility] Pending - Request Sent     [address] [address] [phone]        Alka Lopes RN 9/2/2017 14:41    Email referral to [facility]/referral sent via [employer] In Basket                   Expected Discharge Date and Time     Expected Discharge Date Expected Discharge Time    Sep 3, 2017               Demographic Summary       09/02/17 1500    Referral Information    Admission Type inpatient    Arrived From admitted as an inpatient;home or self-care    Referral Source admission list;physician   CCP consult noted    Reason For Consult discharge planning    Record Reviewed medical record    Contact Information    Permission Granted to Share Information With ;family/designee          "   Functional Status       09/02/17 1501    Functional Status Current    Ambulation 3-->assistive equipment and person    Transferring 3-->assistive equipment and person    Toileting 3-->assistive equipment and person    Bathing 2-->assistive person    Dressing 2-->assistive person    Eating 0-->independent    Communication 0-->understands/communicates without difficulty    Swallowing (if score 2 or more for any item, consult Rehab Services) 0-->swallows foods/liquids without difficulty    Current Functional Level Comment pt encouraged to call for staff assist for needs    Change in Functional Status Since Onset of Current Illness/Injury yes    Functional Status Prior    Ambulation 1-->assistive equipment    Transferring 1-->assistive equipment    Toileting 0-->independent    Bathing 0-->independent    Dressing 0-->independent    Eating 0-->independent    Communication 0-->understands/communicates without difficulty    Swallowing 0-->swallows foods/liquids without difficulty    IADL    Medications assistive person   states she does not drive, her spouse picks up her meds    Meal Preparation independent    Housekeeping independent    Laundry independent    Shopping independent    Oral Care independent    Activity Tolerance    Current Activity Limitations other (see comments)   general post op weakness    Usual Activity Tolerance good    Current Activity Tolerance fair    Cognitive/Perceptual/Developmental    Current Mental Status/Cognitive Functioning no deficits noted            Psychosocial     None            Abuse/Neglect     None            Legal     None            Substance Abuse     None            Patient Forms     None          Alka Lopes, RN

## 2017-09-03 LAB
ANION GAP SERPL CALCULATED.3IONS-SCNC: 12.9 MMOL/L
BASOPHILS # BLD AUTO: 0.03 10*3/MM3 (ref 0–0.2)
BASOPHILS NFR BLD AUTO: 0.4 % (ref 0–1.5)
BUN BLD-MCNC: 17 MG/DL (ref 8–23)
BUN/CREAT SERPL: 11.5 (ref 7–25)
CALCIUM SPEC-SCNC: 8.7 MG/DL (ref 8.6–10.5)
CHLORIDE SERPL-SCNC: 102 MMOL/L (ref 98–107)
CO2 SERPL-SCNC: 25.1 MMOL/L (ref 22–29)
CREAT BLD-MCNC: 1.48 MG/DL (ref 0.57–1)
DEPRECATED RDW RBC AUTO: 50.6 FL (ref 37–54)
EOSINOPHIL # BLD AUTO: 0.19 10*3/MM3 (ref 0–0.7)
EOSINOPHIL NFR BLD AUTO: 2.4 % (ref 0.3–6.2)
ERYTHROCYTE [DISTWIDTH] IN BLOOD BY AUTOMATED COUNT: 14.7 % (ref 11.7–13)
GFR SERPL CREATININE-BSD FRML MDRD: 42 ML/MIN/1.73
GLUCOSE BLD-MCNC: 91 MG/DL (ref 65–99)
HCT VFR BLD AUTO: 33.4 % (ref 35.6–45.5)
HGB BLD-MCNC: 10.4 G/DL (ref 11.9–15.5)
IMM GRANULOCYTES # BLD: 0.03 10*3/MM3 (ref 0–0.03)
IMM GRANULOCYTES NFR BLD: 0.4 % (ref 0–0.5)
LYMPHOCYTES # BLD AUTO: 1.06 10*3/MM3 (ref 0.9–4.8)
LYMPHOCYTES NFR BLD AUTO: 13.4 % (ref 19.6–45.3)
MCH RBC QN AUTO: 29.3 PG (ref 26.9–32)
MCHC RBC AUTO-ENTMCNC: 31.1 G/DL (ref 32.4–36.3)
MCV RBC AUTO: 94.1 FL (ref 80.5–98.2)
MONOCYTES # BLD AUTO: 0.84 10*3/MM3 (ref 0.2–1.2)
MONOCYTES NFR BLD AUTO: 10.6 % (ref 5–12)
NEUTROPHILS # BLD AUTO: 5.74 10*3/MM3 (ref 1.9–8.1)
NEUTROPHILS NFR BLD AUTO: 72.8 % (ref 42.7–76)
NRBC BLD MANUAL-RTO: 0 /100 WBC (ref 0–0)
PLATELET # BLD AUTO: 227 10*3/MM3 (ref 140–500)
PMV BLD AUTO: 12.8 FL (ref 6–12)
POTASSIUM BLD-SCNC: 4.2 MMOL/L (ref 3.5–5.2)
RBC # BLD AUTO: 3.55 10*6/MM3 (ref 3.9–5.2)
SODIUM BLD-SCNC: 140 MMOL/L (ref 136–145)
WBC NRBC COR # BLD: 7.89 10*3/MM3 (ref 4.5–10.7)

## 2017-09-03 PROCEDURE — 80048 BASIC METABOLIC PNL TOTAL CA: CPT | Performed by: ORTHOPAEDIC SURGERY

## 2017-09-03 PROCEDURE — 85025 COMPLETE CBC W/AUTO DIFF WBC: CPT | Performed by: ORTHOPAEDIC SURGERY

## 2017-09-03 PROCEDURE — 97110 THERAPEUTIC EXERCISES: CPT

## 2017-09-03 PROCEDURE — 94799 UNLISTED PULMONARY SVC/PX: CPT

## 2017-09-03 PROCEDURE — 97150 GROUP THERAPEUTIC PROCEDURES: CPT

## 2017-09-03 RX ORDER — BUDESONIDE AND FORMOTEROL FUMARATE DIHYDRATE 160; 4.5 UG/1; UG/1
2 AEROSOL RESPIRATORY (INHALATION)
Status: DISCONTINUED | OUTPATIENT
Start: 2017-09-03 | End: 2017-09-04 | Stop reason: HOSPADM

## 2017-09-03 RX ADMIN — DOCUSATE SODIUM -SENNOSIDES 2 TABLET: 50; 8.6 TABLET, COATED ORAL at 17:48

## 2017-09-03 RX ADMIN — LOSARTAN POTASSIUM 100 MG: 50 TABLET, FILM COATED ORAL at 08:27

## 2017-09-03 RX ADMIN — AMIODARONE HYDROCHLORIDE 200 MG: 200 TABLET ORAL at 08:27

## 2017-09-03 RX ADMIN — HYDROCODONE BITARTRATE AND ACETAMINOPHEN 1 TABLET: 7.5; 325 TABLET ORAL at 06:40

## 2017-09-03 RX ADMIN — HYDROCODONE BITARTRATE AND ACETAMINOPHEN 1 TABLET: 7.5; 325 TABLET ORAL at 00:32

## 2017-09-03 RX ADMIN — FERROUS SULFATE TAB 325 MG (65 MG ELEMENTAL FE) 325 MG: 325 (65 FE) TAB at 17:48

## 2017-09-03 RX ADMIN — DOCUSATE SODIUM -SENNOSIDES 2 TABLET: 50; 8.6 TABLET, COATED ORAL at 09:04

## 2017-09-03 RX ADMIN — FUROSEMIDE 40 MG: 40 TABLET ORAL at 08:26

## 2017-09-03 RX ADMIN — BRIMONIDINE TARTRATE 1 DROP: 2 SOLUTION OPHTHALMIC at 08:29

## 2017-09-03 RX ADMIN — ASPIRIN 325 MG: 325 TABLET, DELAYED RELEASE ORAL at 08:27

## 2017-09-03 RX ADMIN — CLOPIDOGREL 75 MG: 75 TABLET, FILM COATED ORAL at 08:26

## 2017-09-03 RX ADMIN — DOCUSATE SODIUM 100 MG: 100 CAPSULE, LIQUID FILLED ORAL at 08:26

## 2017-09-03 RX ADMIN — POTASSIUM CHLORIDE 10 MEQ: 750 CAPSULE, EXTENDED RELEASE ORAL at 17:48

## 2017-09-03 RX ADMIN — BUDESONIDE AND FORMOTEROL FUMARATE DIHYDRATE 2 PUFF: 160; 4.5 AEROSOL RESPIRATORY (INHALATION) at 21:38

## 2017-09-03 RX ADMIN — BRIMONIDINE TARTRATE 1 DROP: 2 SOLUTION OPHTHALMIC at 17:49

## 2017-09-03 RX ADMIN — FERROUS SULFATE TAB 325 MG (65 MG ELEMENTAL FE) 325 MG: 325 (65 FE) TAB at 08:27

## 2017-09-03 RX ADMIN — DORZOLAMIDE HCL 1 DROP: 20 SOLUTION/ DROPS OPHTHALMIC at 20:47

## 2017-09-03 RX ADMIN — HYDRALAZINE HYDROCHLORIDE 25 MG: 25 TABLET, FILM COATED ORAL at 08:27

## 2017-09-03 RX ADMIN — HYDRALAZINE HYDROCHLORIDE 25 MG: 25 TABLET, FILM COATED ORAL at 17:48

## 2017-09-03 RX ADMIN — BUDESONIDE AND FORMOTEROL FUMARATE DIHYDRATE 2 PUFF: 160; 4.5 AEROSOL RESPIRATORY (INHALATION) at 08:21

## 2017-09-03 RX ADMIN — HYDROCODONE BITARTRATE AND ACETAMINOPHEN 1 TABLET: 7.5; 325 TABLET ORAL at 13:40

## 2017-09-03 RX ADMIN — POTASSIUM CHLORIDE 10 MEQ: 750 CAPSULE, EXTENDED RELEASE ORAL at 08:26

## 2017-09-03 RX ADMIN — BRIMONIDINE TARTRATE 1 DROP: 2 SOLUTION OPHTHALMIC at 20:47

## 2017-09-03 NOTE — PROGRESS NOTES
"    DAILY PROGRESS NOTE  [facility]    Patient Identification:  Name: Juanis Ortega  Age: 76 y.o.  Sex: female  :  1940  MRN: [id number]         Primary Care Physician: Abbey Pollard MD    Subjective:  Interval History: No new issues.  Denies any aspects of chest pain or shortness of breath and nausea has resolved    Objective: Family member ×1 at bedside    Scheduled Meds:  amiodarone 200 mg Oral Daily   aspirin 325 mg Oral Daily   brimonidine 1 drop Both Eyes TID   budesonide-formoterol 2 puff Inhalation BID - RT   clopidogrel 75 mg Oral Daily   dorzolamide 1 drop Both Eyes Nightly   ferrous sulfate 325 mg Oral BID   furosemide 40 mg Oral Daily   hydrALAZINE 25 mg Oral BID   losartan 100 mg Oral Daily   potassium chloride 10 mEq Oral BID   sennosides-docusate sodium 2 tablet Oral BID     Continuous Infusions:     Vital signs in last 24 hours:  Temp:  [97.7 °F (36.5 °C)-99 °F (37.2 °C)] 98 °F (36.7 °C)  Heart Rate:  [52-74] 74  Resp:  [14-20] 20  BP: (101-145)/(53-76) 145/66    Intake/Output:    Intake/Output Summary (Last 24 hours) at 17 0948  Last data filed at 17 1700   Gross per 24 hour   Intake              360 ml   Output                0 ml   Net              360 ml       Exam:  /66 (BP Location: Right arm, Patient Position: Lying)  Pulse 74  Temp 98 °F (36.7 °C) (Oral)   Resp 20  Ht 61\" (154.9 cm)  Wt 186 lb 7 oz (84.6 kg)  SpO2 91%  BMI 35.23 kg/m2    General Appearance:    Alert, cooperative, no distress, AAOx3                          Head:    Normocephalic, without obvious abnormality, atraumatic                         Throat:   Lips, tongue, gums normal; oral mucosa pink and moist                           Neck:   Supple, no JVD                         Lungs:    Clear to auscultation bilaterally, respirations unlabored                          Heart:    Regular rate and rhythm, S1 and S2 normal                  Abdomen:     Soft, non-tender, bowel " sounds active                 Extremities:   No cyanosis and trace edema                        Pulses:   Pulses palpable in lower extremities                                 Data Review:  Labs in chart were reviewed.    Assessment:  Active Hospital Problems (** Indicates Principal Problem)    Diagnosis Date Noted   No active problems to display.      Resolved Hospital Problems    Diagnosis Date Noted Date Resolved   • **Primary osteoarthritis of right hip [M16.11] 08/30/2017 09/02/2017   • Osteoarthritis of one hip [M16.10] 09/01/2017 09/02/2017       Plan:  AFib - RC - not anticoagulated prior to admission - on Plavix      HTN - parameters adjusted - resumed lasix     CKD3 - stable      Nausea - resolved     POD2 R-TASNEEM - DVT prophylaxis per ortho w/ ASA.     Doing very well from a medical perspective - OK w/ DC from my standpoint per Ortho     Thomas Cline MD  9/3/2017  9:48 AM

## 2017-09-03 NOTE — PLAN OF CARE
Problem: Patient Care Overview (Adult)  Goal: Plan of Care Review  Outcome: Ongoing (interventions implemented as appropriate)    09/03/17 0423   Coping/Psychosocial Response Interventions   Plan Of Care Reviewed With patient   Patient Care Overview   Progress improving   Outcome Evaluation   Outcome Summary/Follow up Plan heart rate 60's-70's, po pain medication effective,ambulating with walker. educated on selfmanagement of monitoring heart rate at home       Goal: Adult Individualization and Mutuality  Outcome: Ongoing (interventions implemented as appropriate)  Goal: Discharge Needs Assessment  Outcome: Ongoing (interventions implemented as appropriate)    Problem: Perioperative Period (Adult)  Goal: Signs and Symptoms of Listed Potential Problems Will be Absent or Manageable (Perioperative Period)  Outcome: Ongoing (interventions implemented as appropriate)    Problem: Fall Risk (Adult)  Goal: Absence of Falls  Outcome: Ongoing (interventions implemented as appropriate)    Problem: Hip Replacement, Total (Adult)  Goal: Signs and Symptoms of Listed Potential Problems Will be Absent or Manageable (Hip Replacement, Total)  Outcome: Ongoing (interventions implemented as appropriate)

## 2017-09-03 NOTE — THERAPY TREATMENT NOTE
Acute Care - Physical Therapy Treatment Note  [facility]     Patient Name: Juanis Ortega  : 1940  MRN: [id number]  Today's Date: 9/3/2017  Onset of Illness/Injury or Date of Surgery Date: 17  Date of Referral to PT: 17  Referring Physician: Nely    Admit Date: 2017    Visit Dx:    ICD-10-CM ICD-9-CM   1. Difficulty walking R26.2 719.7     Patient Active Problem List   Diagnosis   (none) - all problems resolved or deleted               Adult Rehabilitation Note       1730 17 1300 17    Rehab Assessment/Intervention    Discipline physical therapist  -JORGE physical therapist  -JORGE physical therapist  -JORGE    Document Type therapy note (daily note)  -JORGE therapy note (daily note)  -JORGE therapy note (daily note)  -JORGE    Subjective Information agree to therapy  -JORGE agree to therapy  -JORGE agree to therapy  -JORGE    Patient Effort, Rehab Treatment good  -JORGE good  -JORGE good  -JORGE    Recorded by [JORGE] Sharona Post, PT [JORGE] Sharona Post, PT [JORGE] Sharona Post, PT    Pain Assessment    Pain Assessment 0-10  -JORGE 0-10  -JORGE 0-10  -JORGE    Pain Score 5  -JORGE 6  -JORGE 6  -JORGE    Pain Location Hip  -JORGE Hip  -JORGE Hip  -JORGE    Pain Orientation Right  -JORGE Right  -JORGE Right  -JORGE    Pain Intervention(s) Medication (See MAR);Repositioned  -JORGE Medication (See MAR);Repositioned  -JORGE Medication (See MAR);Repositioned  -JORGE    Recorded by [JORGE] Sharona Post, PT [JORGE] Sharona Post, PT [JORGE] Sharona oPst, PT    Cognitive Assessment/Intervention    Current Cognitive/Communication Assessment functional  -JORGE functional  -JORGE functional  -JORGE    Orientation Status oriented x 4  -JORGE oriented x 4  -JORGE oriented x 4  -JORGE    Personal Safety WNL/WFL  -JORGE WNL/WFL  -JORGE WNL/WFL  -JORGE    Personal Safety Interventions fall prevention program maintained;gait belt;muscle strengthening facilitated;nonskid shoes/slippers when out of bed  -JORGE fall prevention program maintained;gait belt;muscle strengthening  facilitated;nonskid shoes/slippers when out of bed  -JORGE fall prevention program maintained;gait belt;muscle strengthening facilitated;nonskid shoes/slippers when out of bed  -JORGE    Recorded by [JORGE] Sharona Post PT [JORGE] Sharona Post, PT [JORGE] Sharona Post, PT    Bed Mobility, Assessment/Treatment    Bed Mob, Supine to Sit, [place] contact guard assist;verbal cues required  -JORGE      Bed Mob, Sit to Supine, [place] contact guard assist;verbal cues required  -JORGE      Recorded by [JORGE] Sharona Post PT      Transfer Assessment/Treatment    Transfers, Sit-Stand [place] contact guard assist;verbal cues required  -JORGE minimum assist (75% patient effort);contact guard assist;verbal cues required  -JORGE minimum assist (75% patient effort);contact guard assist;verbal cues required  -JORGE    Transfers, Stand-Sit [place] contact guard assist;verbal cues required  -JORGE minimum assist (75% patient effort);contact guard assist;verbal cues required  -JORGE minimum assist (75% patient effort);contact guard assist;verbal cues required  -JORGE    Transfers, Sit-Stand-Sit, Assist Device rolling walker  -JORGE      Recorded by [JORGE] Sharona Post PT [JORGE] Sharona Post, PT [JORGE] Sharona Post, PT    Gait Assessment/Treatment    Gait, [place] Level contact guard assist;verbal cues required  -JORGE minimum assist (75% patient effort);contact guard assist;verbal cues required  -JORGE minimum assist (75% patient effort);contact guard assist;verbal cues required  -JORGE    Gait, Assistive Device rolling walker  -JORGE rolling walker  -JORGE rolling walker  -JORGE    Gait, Distance (Feet) 50  -JORGE 50  -JORGE 40  -JORGE    Gait, Gait Deviations antalgic;sujey decreased;forward flexed posture  -JORGE antalgic;sujey decreased;forward flexed posture  -JORGE antalgic;sujey decreased;forward flexed posture  -JORGE    Recorded by [JORGE] Sharona Post, PT [JORGE] Sharona Post, PT [JORGE] Sharona Post, PT    Therapy Exercises    Exercise  Protocols total hip  -JORGE total hip  -JORGE total hip  -JORGE    Total Hip Exercises 25 reps;completed protocol  -JORGE completed protocol;20 reps  -JORGE 15 reps;completed protocol  -JORGE    Recorded by [JORGE] Sharona Post, PT [JORGE] Sharona Post, PT [JORGE] Sharona Post, PT    Positioning and Restraints    Pre-Treatment Position sitting in chair/recliner  -JORGE sitting in chair/recliner  -JORGE sitting in chair/recliner  -JORGE    Post Treatment Position chair  -JORGE chair  -JORGE chair  -JORGE    In Chair reclined;call light within reach;with family/caregiver  -JORGE reclined;call light within reach  -JORGE reclined;call light within reach  -JORGE    Recorded by [JORGE] Sharona Post, PT [JORGE] Sharona Post, PT [JORGE] Sharona Post, PT      User Key  (r) = Recorded By, (t) = Taken By, (c) = Cosigned By    Initials Name Effective Dates    JORGE Post, PT 10/06/15 -                 IP PT Goals       09/01/17 1529          Bed Mobility PT LTG    Bed Mobility PT LTG, Date Established 09/01/17  -PC      Bed Mobility PT LTG, Time to Achieve 1 wk  -PC      Bed Mobility PT LTG, Activity Type supine to sit/sit to supine  -PC      Bed Mobility PT LTG, [place] Level minimum assist (75% patient effort)  -PC      Transfer Training PT LTG    Transfer Training PT LTG, Date Established 09/01/17  -PC      Transfer Training PT LTG, Time to Achieve 1 wk  -PC      Transfer Training PT LTG, Activity Type sit to stand/stand to sit  -PC      Transfer Training PT LTG, [place] Level contact guard assist  -PC      Gait Training PT LTG    Gait Training Goal PT LTG, Date Established 09/01/17  -PC      Gait Training Goal PT LTG, Time to Achieve 1 wk  -PC      Gait Training Goal PT LTG, [place] Level contact guard assist  -PC      Gait Training Goal PT LTG, Assist Device walker, rolling  -PC      Gait Training Goal PT LTG, Distance to Achieve 50 ft  -PC      Stair Training PT LTG    Stair Training Goal PT LTG, Date Established 09/01/17  -PC      Stair  Training Goal PT LTG, Time to Achieve 1 wk  -PC      Stair Training Goal PT LTG, Number of Steps 4  -PC      Stair Training Goal PT LTG, [place] Level minimum assist (75% patient effort)  -PC      Stair Training Goal PT LTG, Assist Device 1 handrail  -PC        User Key  (r) = Recorded By, (t) = Taken By, (c) = Cosigned By    Initials Name Provider Type    PC Leona Garzon, PT Physical Therapist          Physical Therapy Education     Title: PT OT SLP Therapies (Active)     Topic: Physical Therapy (Active)     Point: Mobility training (Done)    Learning Progress Summary    Learner Readiness Method Response Comment Documented by Status   Patient Acceptance E VU,NR  JORGE 09/03/17 1549 Done    Acceptance E VU,NR  JORGE 09/02/17 1717 Done    Acceptance E,D NR   09/01/17 1529 Active               Point: Home exercise program (Done)    Learning Progress Summary    Learner Readiness Method Response Comment Documented by Status   Patient Acceptance E VU,NR  JORGE 09/03/17 1549 Done    Acceptance E VU,NR  JORGE 09/02/17 1717 Done    Acceptance E,D NR   09/01/17 1529 Active               Point: Body mechanics (Active)    Learning Progress Summary    Learner Readiness Method Response Comment Documented by Status   Patient Acceptance E,D NR   09/01/17 1529 Active               Point: Precautions (Active)    Learning Progress Summary    Learner Readiness Method Response Comment Documented by Status   Patient Acceptance E,D NR   09/01/17 1529 Active                      User Key     Initials Effective Dates Name Provider Type Discipline    JORGE 10/06/15 -  Sharona Post, PT Physical Therapist PT     12/01/15 -  Leona Garzon, PT Physical Therapist PT                    PT Recommendation and Plan  Anticipated Equipment Needs At Discharge: front wheeled walker, bedside commode  Anticipated Discharge Disposition: home with assist, home with home health  Planned Therapy Interventions: bed mobility training, gait training,  transfer training, strengthening, ROM (Range of Motion)  PT Frequency: 2 times/day  Plan of Care Review  Plan Of Care Reviewed With: patient  Progress: progress toward functional goals as expected          Outcome Measures       09/03/17 1500 09/02/17 1700 09/01/17 1500    How much help from another person do you currently need...    Turning from your back to your side while in flat bed without using bedrails? 3  -JORGE 3  -JROGE 2  -PC    Moving from lying on back to sitting on the side of a flat bed without bedrails? 3  -JORGE 3  -JORGE 2  -PC    Moving to and from a bed to a chair (including a wheelchair)? 3  -JORGE 3  -JORGE 2  -PC    Standing up from a chair using your arms (e.g., wheelchair, bedside chair)? 3  -JORGE 3  -JORGE 2  -PC    Climbing 3-5 steps with a railing? 3  -JORGE 3  -JORGE 1  -PC    To walk in hospital room? 3  -JORGE 3  -JORGE 2  -PC    AM-PAC 6 Clicks Score 18  -JORGE 18  -JORGE 11  -PC    Functional Assessment    Outcome Measure Options AM-PAC 6 Clicks Basic Mobility (PT)  -JORGE AM-PAC 6 Clicks Basic Mobility (PT)  -JORGE AM-PAC 6 Clicks Basic Mobility (PT)  -PC      User Key  (r) = Recorded By, (t) = Taken By, (c) = Cosigned By    Initials Name Provider Type    JORGE Post, PT Physical Therapist    PC Leona Garzon, PT Physical Therapist           Time Calculation:         PT Charges       09/03/17 1550          Time Calculation    Start Time 0930  -JORGE      Stop Time 1030  -JORGE      Time Calculation (min) 60 min  -JORGE      PT Received On 09/03/17  -JORGE        User Key  (r) = Recorded By, (t) = Taken By, (c) = Cosigned By    Initials Name Provider Type    JORGE Post PT Physical Therapist          Therapy Charges for Today     Code Description Service Date Service Provider Modifiers Qty    44697644719 HC PT THER PROC GROUP 9/2/2017 Sharona Post, PT GP 1    88825384055 HC PT THER PROC EA 15 MIN 9/2/2017 Sharona Post PT GP 1    63712089090 HC PT THER PROC GROUP 9/2/2017 Sharona Post, PT GP 1    [id number]   PT THER PROC EA 15 MIN 9/2/2017 Sharona Post, PT GP 1    79944966542 HC PT THER PROC GROUP 9/3/2017 Sharona Post, PT GP 1    28404916022 HC PT THER PROC EA 15 MIN 9/3/2017 Sharona Post, PT GP 1          PT G-Codes  Outcome Measure Options: AM-PAC 6 Clicks Basic Mobility (PT)    Sharona Post, PT  9/3/2017

## 2017-09-03 NOTE — PLAN OF CARE
Problem: Patient Care Overview (Adult)  Goal: Plan of Care Review  Outcome: Ongoing (interventions implemented as appropriate)    09/03/17 1774   Coping/Psychosocial Response Interventions   Plan Of Care Reviewed With patient   Patient Care Overview   Progress progress toward functional goals as expected

## 2017-09-03 NOTE — PROGRESS NOTES
"Orthopaedic Surgery  Daily Progress Note    /71 (BP Location: Right arm, Patient Position: Lying)  Pulse 70  Temp 99 °F (37.2 °C) (Oral)   Resp 14  Ht 61\" (154.9 cm)  Wt 186 lb 7 oz (84.6 kg)  SpO2 95%  BMI 35.23 kg/m2    Lab Results (last 24 hours)     Procedure Component Value Units Date/Time    Basic Metabolic Panel [[id number]]  (Abnormal) Collected:  09/03/17 0447    Specimen:  Blood Updated:  09/03/17 0548     Glucose 91 mg/dL      BUN 17 mg/dL      Creatinine 1.48 (H) mg/dL      Sodium 140 mmol/L      Potassium 4.2 mmol/L      Chloride 102 mmol/L      CO2 25.1 mmol/L      Calcium 8.7 mg/dL      eGFR  African Amer 42 (L) mL/min/1.73      BUN/Creatinine Ratio 11.5     Anion Gap 12.9 mmol/L     Narrative:       The MDRD GFR formula is only valid for adults with stable renal function between ages 18 and 70.    CBC Auto Differential [[id number]]  (Abnormal) Collected:  09/03/17 0447    Specimen:  Blood Updated:  09/03/17 0553     WBC 7.89 10*3/mm3      RBC 3.55 (L) 10*6/mm3      Hemoglobin 10.4 (L) g/dL      Hematocrit 33.4 (L) %      MCV 94.1 fL      MCH 29.3 pg      MCHC 31.1 (L) g/dL      RDW 14.7 (H) %      RDW-SD 50.6 fl      MPV 12.8 (H) fL      Platelets 227 10*3/mm3      Neutrophil % 72.8 %      Lymphocyte % 13.4 (L) %      Monocyte % 10.6 %      Eosinophil % 2.4 %      Basophil % 0.4 %      Immature Grans % 0.4 %      Neutrophils, Absolute 5.74 10*3/mm3      Lymphocytes, Absolute 1.06 10*3/mm3      Monocytes, Absolute 0.84 10*3/mm3      Eosinophils, Absolute 0.19 10*3/mm3      Basophils, Absolute 0.03 10*3/mm3      Immature Grans, Absolute 0.03 10*3/mm3      nRBC 0.0 /100 WBC     CBC & Differential [[id number]] Collected:  09/03/17 0447    Specimen:  Blood Updated:  09/03/17 0553    Narrative:       The following orders were created for panel order CBC & Differential.  Procedure                               Abnormality         Status                     ---------                               " -----------         ------                     Scan Slide[[id number]]                                                                  CBC Auto Differential[[id number]]        Abnormal            Final result                 Please view results for these tests on the individual orders.          Imaging Results (last 24 hours)     ** No results found for the last 24 hours. **          Patient Care Team:  Abbey Pollard MD as PCP - General  Abbey Pollard MD as PCP - Family Medicine  Nehemias Griffin MD as Consulting Physician (Cardiac Electrophysiology)  Elier Smith MD as Consulting Physician (Pulmonary Disease)    SUBJECTIVE  Pain controlled    PHYSICAL EXAM  Resting in NAD  Dressing clean, dry, intact  Toes warm, perfused, brisk capillary refill  Flexes/extends toes, ADF/APF  SILT s/s/dp/sp/t  No pain with passive stretch     Active Problems:    * No active hospital problems. *      PLAN / DISPOSITION:  POD 2 s/p R TASNEEM, doing well  1. Pain control: Orals  2.  Antibiotics: Periop complete  3.  PT: WBAT, anterior precautions  4. DVT: ASA 325mg plus mobilization  5.  CRI: Creatine at baseline (1.5 today, 1.5 preop)  6. Dispo: to rehab today    Feliz Parekh Jr, MD  09/03/17  7:14 AM

## 2017-09-03 NOTE — PLAN OF CARE
Problem: Patient Care Overview (Adult)  Goal: Plan of Care Review  Outcome: Ongoing (interventions implemented as appropriate)    09/03/17 1549 09/03/17 1830   Coping/Psychosocial Response Interventions   Plan Of Care Reviewed With patient --    Patient Care Overview   Progress progress toward functional goals as expected --    Outcome Evaluation   Outcome Summary/Follow up Plan --  Pt ambulated with PT and staff. Dressing CDI, vitals WNL. Discharge tomorrow to rehab. Will continue to monitor heartrate r/t history of a fib.       Goal: Adult Individualization and Mutuality  Outcome: Ongoing (interventions implemented as appropriate)  Goal: Discharge Needs Assessment  Outcome: Ongoing (interventions implemented as appropriate)    Problem: Perioperative Period (Adult)  Goal: Signs and Symptoms of Listed Potential Problems Will be Absent or Manageable (Perioperative Period)  Outcome: Outcome(s) achieved Date Met:  09/03/17  Goal: Signs and Symptoms of Listed Potential Problems Will be Absent or Manageable (Perioperative Period)  Outcome: Outcome(s) achieved Date Met:  09/03/17    Problem: Fall Risk (Adult)  Goal: Identify Related Risk Factors and Signs and Symptoms  Outcome: Ongoing (interventions implemented as appropriate)  Goal: Absence of Falls  Outcome: Ongoing (interventions implemented as appropriate)    Problem: Hip Replacement, Total (Adult)  Goal: Signs and Symptoms of Listed Potential Problems Will be Absent or Manageable (Hip Replacement, Total)  Outcome: Ongoing (interventions implemented as appropriate)

## 2017-09-03 NOTE — SIGNIFICANT NOTE
09/03/17 1551   Rehab Treatment   Discipline physical therapist   Treatment Not Performed patient/family decline treatment, pt not feeling well   Recommendation   PT - Next Appointment 09/04/17

## 2017-09-04 VITALS
HEART RATE: 70 BPM | HEIGHT: 61 IN | OXYGEN SATURATION: 95 % | SYSTOLIC BLOOD PRESSURE: 126 MMHG | TEMPERATURE: 97.5 F | WEIGHT: 186.44 LBS | RESPIRATION RATE: 18 BRPM | DIASTOLIC BLOOD PRESSURE: 68 MMHG | BODY MASS INDEX: 35.2 KG/M2

## 2017-09-04 PROCEDURE — 94799 UNLISTED PULMONARY SVC/PX: CPT

## 2017-09-04 PROCEDURE — 97110 THERAPEUTIC EXERCISES: CPT

## 2017-09-04 RX ORDER — HYDROCODONE BITARTRATE AND ACETAMINOPHEN 7.5; 325 MG/1; MG/1
1-2 TABLET ORAL EVERY 4 HOURS PRN
Qty: 80 TABLET | Refills: 0 | Status: SHIPPED | OUTPATIENT
Start: 2017-09-04

## 2017-09-04 RX ADMIN — FUROSEMIDE 40 MG: 40 TABLET ORAL at 08:08

## 2017-09-04 RX ADMIN — BRIMONIDINE TARTRATE 1 DROP: 2 SOLUTION OPHTHALMIC at 08:16

## 2017-09-04 RX ADMIN — POTASSIUM CHLORIDE 10 MEQ: 750 CAPSULE, EXTENDED RELEASE ORAL at 08:08

## 2017-09-04 RX ADMIN — CLOPIDOGREL 75 MG: 75 TABLET, FILM COATED ORAL at 08:08

## 2017-09-04 RX ADMIN — BUDESONIDE AND FORMOTEROL FUMARATE DIHYDRATE 2 PUFF: 160; 4.5 AEROSOL RESPIRATORY (INHALATION) at 10:55

## 2017-09-04 RX ADMIN — HYDROCODONE BITARTRATE AND ACETAMINOPHEN 1 TABLET: 7.5; 325 TABLET ORAL at 00:15

## 2017-09-04 RX ADMIN — DOCUSATE SODIUM -SENNOSIDES 2 TABLET: 50; 8.6 TABLET, COATED ORAL at 08:08

## 2017-09-04 RX ADMIN — FERROUS SULFATE TAB 325 MG (65 MG ELEMENTAL FE) 325 MG: 325 (65 FE) TAB at 08:08

## 2017-09-04 RX ADMIN — AMIODARONE HYDROCHLORIDE 200 MG: 200 TABLET ORAL at 08:08

## 2017-09-04 RX ADMIN — HYDRALAZINE HYDROCHLORIDE 25 MG: 25 TABLET, FILM COATED ORAL at 08:08

## 2017-09-04 RX ADMIN — HYDROCODONE BITARTRATE AND ACETAMINOPHEN 1 TABLET: 7.5; 325 TABLET ORAL at 08:08

## 2017-09-04 RX ADMIN — LOSARTAN POTASSIUM 100 MG: 50 TABLET, FILM COATED ORAL at 08:08

## 2017-09-04 RX ADMIN — ASPIRIN 325 MG: 325 TABLET, DELAYED RELEASE ORAL at 08:08

## 2017-09-04 RX ADMIN — HYDROCODONE BITARTRATE AND ACETAMINOPHEN 1 TABLET: 7.5; 325 TABLET ORAL at 12:26

## 2017-09-04 NOTE — THERAPY TREATMENT NOTE
Acute Care - Physical Therapy Treatment Note  [facility]     Patient Name: Juanis Ortega  : 1940  MRN: [id number]  Today's Date: 2017  Onset of Illness/Injury or Date of Surgery Date: 17  Date of Referral to PT: 17  Referring Physician: Nely    Admit Date: 2017    Visit Dx:    ICD-10-CM ICD-9-CM   1. Difficulty walking R26.2 719.7     Patient Active Problem List   Diagnosis   (none) - all problems resolved or deleted               Adult Rehabilitation Note       17 0908 17 0930 17 1300    Rehab Assessment/Intervention    Discipline physical therapist  -AR physical therapist  -JORGE physical therapist  -JORGE    Document Type therapy note (daily note)  -AR therapy note (daily note)  -JORGE therapy note (daily note)  -JORGE    Subjective Information agree to therapy  -AR agree to therapy  -JORGE agree to therapy  -JORGE    Patient Effort, Rehab Treatment good  -AR good  -JORGE good  -JORGE    Precautions/Limitations fall precautions;anterior hip precautions- right  -AR      Recorded by [AR] Violette Carpenter, PT [JORGE] Sharona Post, PT [JORGE] Sharona Post, PT    Pain Assessment    Pain Assessment 0-10  -AR 0-10  -JORGE 0-10  -JORGE    Pain Score 6  -AR 5  -JORGE 6  -JORGE    Pain Location Hip  -AR Hip  -JORGE Hip  -JORGE    Pain Orientation Right  -AR Right  -JORGE Right  -JORGE    Pain Intervention(s) Medication (See MAR)  -AR Medication (See MAR);Repositioned  -JORGE Medication (See MAR);Repositioned  -JORGE    Recorded by [AR] Violette Carpenter, PT [JORGE] Sharona Post, PT [JORGE] Sharona Post, PT    Cognitive Assessment/Intervention    Current Cognitive/Communication Assessment functional  -AR functional  -JORGE functional  -JORGE    Orientation Status oriented x 4  -AR oriented x 4  -JORGE oriented x 4  -JORGE    Personal Safety  WNL/WFL  -JORGE WNL/WFL  -JORGE    Personal Safety Interventions  fall prevention program maintained;gait belt;muscle strengthening facilitated;nonskid shoes/slippers when out of bed  -JORGE fall prevention  program maintained;gait belt;muscle strengthening facilitated;nonskid shoes/slippers when out of bed  -JORGE    Recorded by [AR] Violette Carpenter, PT [JORGE] Sharona Post, PT [JORGE] Sharona Post, PT    Bed Mobility, Assessment/Treatment    Bed Mob, Supine to Sit, [place] not tested  -AR contact guard assist;verbal cues required  -JORGE     Bed Mob, Sit to Supine, [place] not tested  -AR contact guard assist;verbal cues required  -JORGE     Recorded by [AR] Violette Carpenter, PT [JORGE] Sharona Post, PT     Transfer Assessment/Treatment    Transfers, Sit-Stand [place] contact guard assist  -AR contact guard assist;verbal cues required  -JORGE minimum assist (75% patient effort);contact guard assist;verbal cues required  -JORGE    Transfers, Stand-Sit [place] contact guard assist  -AR contact guard assist;verbal cues required  -JORGE minimum assist (75% patient effort);contact guard assist;verbal cues required  -JORGE    Transfers, Sit-Stand-Sit, Assist Device rolling walker  -AR rolling walker  -JORGE     Recorded by [AR] Violette Carpenter, PT [JORGE] Sharona Post, PT [JORGE] Sharona Post, PT    Gait Assessment/Treatment    Gait, [place] Level stand by assist  -AR contact guard assist;verbal cues required  -JORGE minimum assist (75% patient effort);contact guard assist;verbal cues required  -JORGE    Gait, Assistive Device rolling walker  -AR rolling walker  -JORGE rolling walker  -JORGE    Gait, Distance (Feet) 60  -AR 50  -JORGE 50  -JORGE    Gait, Gait Deviations suejy decreased;decreased heel strike;antalgic;forward flexed posture  -AR antalgic;sujey decreased;forward flexed posture  -JORGE antalgic;sujey decreased;forward flexed posture  -JORGE    Gait, Safety Issues step length decreased  -AR      Gait, Impairments strength decreased;impaired balance  -AR      Recorded by [AR] Violette Carpenter, PT [JORGE] Sharona Post, PT [JORGE] Sharona Pots, PT    Therapy Exercises    Exercise Protocols total hip  -AR total hip  -JORGE total  hip  -JORGE    Total Hip Exercises 30 reps;completed protocol  -AR 25 reps;completed protocol  -JORGE completed protocol;20 reps  -JORGE    Recorded by [AR] Violette Carpenter, PT [JORGE] Sharona Post, PT [JORGE] Sharona Post, PT    Positioning and Restraints    Pre-Treatment Position sitting in chair/recliner  -AR sitting in chair/recliner  -JORGE sitting in chair/recliner  -JORGE    Post Treatment Position chair  -AR chair  -JORGE chair  -JORGE    In Chair reclined;sitting;call light within reach;encouraged to call for assist  -AR reclined;call light within reach;with family/caregiver  -JORGE reclined;call light within reach  -JORGE    Recorded by [AR] Violette Carpenter, PT [JORGE] Sharona Post, PT [JORGE] Sharona Post, PT      09/02/17 0930          Rehab Assessment/Intervention    Discipline physical therapist  -JORGE      Document Type therapy note (daily note)  -JORGE      Subjective Information agree to therapy  -JORGE      Patient Effort, Rehab Treatment good  -JORGE      Recorded by [JORGE] Sharona Post PT      Pain Assessment    Pain Assessment 0-10  -JORGE      Pain Score 6  -JORGE      Pain Location Hip  -JORGE      Pain Orientation Right  -JORGE      Pain Intervention(s) Medication (See MAR);Repositioned  -JORGE      Recorded by [JORGE] Sharona Post PT      Cognitive Assessment/Intervention    Current Cognitive/Communication Assessment functional  -JORGE      Orientation Status oriented x 4  -JORGE      Personal Safety WNL/WFL  -JORGE      Personal Safety Interventions fall prevention program maintained;gait belt;muscle strengthening facilitated;nonskid shoes/slippers when out of bed  -JORGE      Recorded by [JORGE] Sharona Post PT      Transfer Assessment/Treatment    Transfers, Sit-Stand [place] minimum assist (75% patient effort);contact guard assist;verbal cues required  -JORGE      Transfers, Stand-Sit [place] minimum assist (75% patient effort);contact guard assist;verbal cues required  -JORGE      Recorded by [JORGE] Sharona Post, PT      Gait  Assessment/Treatment    Gait, [place] Level minimum assist (75% patient effort);contact guard assist;verbal cues required  -JORGE      Gait, Assistive Device rolling walker  -JORGE      Gait, Distance (Feet) 40  -JORGE      Gait, Gait Deviations antalgic;sujey decreased;forward flexed posture  -JORGE      Recorded by [JORGE] Sharona Post, PT      Therapy Exercises    Exercise Protocols total hip  -JORGE      Total Hip Exercises 15 reps;completed protocol  -JORGE      Recorded by [JORGE] Sharona Post PT      Positioning and Restraints    Pre-Treatment Position sitting in chair/recliner  -JORGE      Post Treatment Position chair  -JORGE      In Chair reclined;call light within reach  -JORGE      Recorded by [JORGE] Sharona Post, PT        User Key  (r) = Recorded By, (t) = Taken By, (c) = Cosigned By    Initials Name Effective Dates    JORGE Post, PT 10/06/15 -     AR Violette Carpenter, PT 06/27/16 -                 IP PT Goals       09/01/17 1529          Bed Mobility PT LTG    Bed Mobility PT LTG, Date Established 09/01/17  -PC      Bed Mobility PT LTG, Time to Achieve 1 wk  -PC      Bed Mobility PT LTG, Activity Type supine to sit/sit to supine  -PC      Bed Mobility PT LTG, [place] Level minimum assist (75% patient effort)  -PC      Transfer Training PT LTG    Transfer Training PT LTG, Date Established 09/01/17  -PC      Transfer Training PT LTG, Time to Achieve 1 wk  -PC      Transfer Training PT LTG, Activity Type sit to stand/stand to sit  -PC      Transfer Training PT LTG, [place] Level contact guard assist  -PC      Gait Training PT LTG    Gait Training Goal PT LTG, Date Established 09/01/17  -PC      Gait Training Goal PT LTG, Time to Achieve 1 wk  -PC      Gait Training Goal PT LTG, [place] Level contact guard assist  -PC      Gait Training Goal PT LTG, Assist Device walker, rolling  -PC      Gait Training Goal PT LTG, Distance to Achieve 50 ft  -PC      Stair Training PT LTG    Stair Training Goal PT  LTG, Date Established 09/01/17  -PC      Stair Training Goal PT LTG, Time to Achieve 1 wk  -PC      Stair Training Goal PT LTG, Number of Steps 4  -PC      Stair Training Goal PT LTG, [place] Level minimum assist (75% patient effort)  -PC      Stair Training Goal PT LTG, Assist Device 1 handrail  -PC        User Key  (r) = Recorded By, (t) = Taken By, (c) = Cosigned By    Initials Name Provider Type    PC Leona Garzon, PT Physical Therapist          Physical Therapy Education     Title: PT OT SLP Therapies (Done)     Topic: Physical Therapy (Done)     Point: Mobility training (Done)    Learning Progress Summary    Learner Readiness Method Response Comment Documented by Status   Patient Acceptance E VU  AR 09/04/17 0914 Done    Acceptance E VU,NR   09/03/17 1549 Done    Acceptance E VU,NR  JORGE 09/02/17 1717 Done    Acceptance E,D NR   09/01/17 1529 Active               Point: Home exercise program (Done)    Learning Progress Summary    Learner Readiness Method Response Comment Documented by Status   Patient Acceptance E VU  AR 09/04/17 0914 Done    Acceptance E VU,NR  JORGE 09/03/17 1549 Done    Acceptance E VU,NR  JORGE 09/02/17 1717 Done    Acceptance E,D NR  PC 09/01/17 1529 Active               Point: Body mechanics (Done)    Learning Progress Summary    Learner Readiness Method Response Comment Documented by Status   Patient Acceptance E VU  AR 09/04/17 0914 Done    Acceptance E,D NR  PC 09/01/17 1529 Active               Point: Precautions (Done)    Learning Progress Summary    Learner Readiness Method Response Comment Documented by Status   Patient Acceptance E VU  AR 09/04/17 0914 Done    Acceptance E,D NR   09/01/17 1529 Active                      User Key     Initials Effective Dates Name Provider Type Discipline    JORGE 10/06/15 -  Sharona Post, PT Physical Therapist PT    PC 12/01/15 -  Leona Garzon, PT Physical Therapist PT    AR 06/27/16 -  Violette Carpenter, PT Physical Therapist PT                     PT Recommendation and Plan  Anticipated Equipment Needs At Discharge: front wheeled walker, bedside commode  Anticipated Discharge Disposition: home with assist, home with home health  Planned Therapy Interventions: bed mobility training, gait training, transfer training, strengthening, ROM (Range of Motion)  PT Frequency: 2 times/day  Plan of Care Review  Plan Of Care Reviewed With: patient  Outcome Summary/Follow up Plan: Pt demonstrates improving activity toleranceand independence w/ mobility during PT.  Pt discharging to [facility] today.            Outcome Measures       09/03/17 1500 09/02/17 1700 09/01/17 1500    How much help from another person do you currently need...    Turning from your back to your side while in flat bed without using bedrails? 3  -JORGE 3  -JORGE 2  -PC    Moving from lying on back to sitting on the side of a flat bed without bedrails? 3  -JORGE 3  -JORGE 2  -PC    Moving to and from a bed to a chair (including a wheelchair)? 3  -JORGE 3  -JORGE 2  -PC    Standing up from a chair using your arms (e.g., wheelchair, bedside chair)? 3  -JORGE 3  -JORGE 2  -PC    Climbing 3-5 steps with a railing? 3  -JORGE 3  -JORGE 1  -PC    To walk in hospital room? 3  -JORGE 3  -JORGE 2  -PC    AM-PAC 6 Clicks Score 18  -JORGE 18  -JORGE 11  -PC    Functional Assessment    Outcome Measure Options AM-PAC 6 Clicks Basic Mobility (PT)  -JORGE AM-PAC 6 Clicks Basic Mobility (PT)  -JORGE AM-PAC 6 Clicks Basic Mobility (PT)  -PC      User Key  (r) = Recorded By, (t) = Taken By, (c) = Cosigned By    Initials Name Provider Type    JORGE Sharona Post, PT Physical Therapist    PC Leona Garzon, PT Physical Therapist           Time Calculation:         PT Charges       09/04/17 0914          Time Calculation    Start Time 0840  -AR      Stop Time 0900  -AR      Time Calculation (min) 20 min  -AR      PT Received On 09/04/17  -AR      PT - Next Appointment 09/05/17  -AR        User Key  (r) = Recorded By, (t) = Taken By, (c) = Cosigned By    Initials Name  Provider Type    AR Violette Carpenter PT Physical Therapist          Therapy Charges for Today     Code Description Service Date Service Provider Modifiers Qty    09432204673 HC PT THER PROC EA 15 MIN 9/4/2017 Violette Carpenter, PT GP 1          PT G-Codes  Outcome Measure Options: AM-PAC 6 Clicks Basic Mobility (PT)    Violette Carpenter PT  9/4/2017

## 2017-09-04 NOTE — DISCHARGE SUMMARY
Discharge Summary    Patient Name:  Juanis Ortega  YOB: 1940  Medical Records Number:  [id number]    Date of Admission:  9/1/2017  Date of Discharge:  9/4/2017    Primary Discharge Diagnosis:  Osteoarthritis of one hip [M16.10]    Secondary Discharge Diagnosis:    Problem List Items Addressed This Visit     None          Procedures Performed:  Right Total Hip Arthroplasty      Hospital Course:    Juanis Ortega is a 76 y.o.  female who underwent successful right manfred on 9/1/2017.  Juanis Ortega was started on Aspirin 325mg po twice daily immediately post-operatively for DVT prophylaxis.  On post-op day 1 the patients dressing was changed and their incision was clean, with no signs of infection and their calf was soft, with no signs of DVT.  The patient progressed well with physical therapy and the patients hemoglobin remained stable. On post-operative day 3 the patient was felt ready for discharge.      Total Hip Replacement Discharge Instructions:    I. ACTIVITIES:  1. Exercises:  ? Complete exercise program as taught by the hospital physical therapist 2 times per day  ? Exercise program will be advanced by the physical therapist  ? During the day be up ambulating every 2 hours (while awake) for short distances  ? Complete the ankle pump exercises at least 10 times per hour (while awake)  ? Elevate legs when in bed and for at least 30 minutes during the day.Use cold packs 20-30 minutes approximately 5 times per day. This should be done before and after completing your exercises and at any time you are experiencing pain/ stiffness in your operative extremity.      2. Activities of Daily Living:  ? No tub baths, hot tubs, or swimming pools for 4 weeks  ? May shower and let water run over the incision on post-operative day #5 if no drainage. Do not scrub or rub the incision. Simply let the water run over the incision and pat dry.    II. Restrictions  ? Continue hip precautions as taught at the  hospital  ? Your surgeon will discuss with you when you will be able to drive again.  ? Weight bearing is as tolerated  ? First week stay inside on even terrain. May go up and down stairs one stair at a time utilizing the hand rail once cleared by physical therapy to do so.  ? After one week, you may venture outside (if cleared to do so by physical therapist).    III. Precautions:  ? Everyone that comes near you should wash their hands  ? No elective dental, genital-urinary, or colon procedures or surgical procedures for 12 weeks after surgery unless absolutely necessary.  ?  If dental work or surgical procedure is deemed absolutely necessary, you will need to contact your surgeon as you will need to take antibiotics 1 hour prior to any dental work (including teeth cleanings).  ? Please discuss with your surgeon prophylactic antibiotics as the length of time this intervention will be necessary for you varies with each patient’s health history and situation.  ? Avoid sick people. If you must be around someone who is ill, they should wear a mask.  ? Avoid visits to the Emergency Room or Urgent Care unless you are having a life threatening event.   ? If ordered stockings are to be placed on in the morning and removed at night. Monitor the stockings to ensure that any swelling is not causing the stockings to become too tight. In this case, remove stockings immediately.    IV. INCISION CARE:  ? Wash your hands prior to dressing changes  ? Change the dressing as needed to keep incision clean and dry. Utilize dry gauze and paper tape. Avoid touching the side of the gauze that goes against the incision with your hands.  ? No creams or ointments to the incision  ? May remove dressing once the incision is free of drainage  ? Do not touch or pick at the incision  ? Check incision every day and notify surgeon immediately if any of the following signs or symptoms are noted:  o Increase in redness  o Increase in swelling around  the incision and of the entire extremity  o Increase in pain  o Drainage oozing from the incision  o Pulling apart of the edges of the incision  o Increase in overall body temperature (greater than 100.5 degrees)  ? Your surgeon will instruct you regarding suture or staple removal    V. Medications:   1. Anticoagulants: You will be discharged on an anticoagulant. This is a prophylactic medication that helps prevent blood clots during your post-operative period. The type and length of dosage varies based on your individual needs, procedure performed, and surgeon’s preference.  ? While taking the anticoagulant, you should avoid taking any additional aspirin, ibuprofen (Advil or Motrin), Aleve (Naprosyn) or other non-steroidal anti-inflammatory medications.   ? Notify surgeon immediately if any osmar bleeding is noted in the urine, stool, emesis, or from the nose or the incision. Blood in the stool will often appear as black rather than red. Blood in urine may appear as pink. Blood in emesis may appear as brown/black like coffee grounds.  ? You will need to apply pressure for longer periods of time to any cuts or abrasions to stop bleeding  ? Avoid alcohol while taking anticoagulants    2. Stool Softeners: You will be at greater risk of constipation after surgery due to being less mobile and the pain medications.   ? Take stool softeners as instructed by your surgeon while on pain medications. Over the counter Colace 100 mg 1-2 capsules twice daily.   ? If stools become too loose or too frequent, please decreases the dosage or stop the stool softener.  ? If constipation occurs despite use of stool softeners, you are to continue the stool softeners and add a laxative (Milk of Magnesia 1 ounce daily as needed)  ? Drink plenty of fluids, and eat fruits and vegetables during your recovery time    3. Pain Medications utilized after surgery are narcotics and the law requires that the following information be given to all  patients that are prescribed narcotics:  ? CLASSIFICATION: Pain medications are called Opioids and are narcotics  ? LEGALITIES: It is illegal to share narcotics with others and to drive within 24 hours of taking narcotics  ? POTENTIAL SIDE EFFECTS: Potential side effects of opioids include: nausea, vomiting, itching, dizziness, drowsiness, dry mouth, constipation, and difficulty urinating.  ? POTENTIAL ADVERSE EFFECTS:   o Opioid tolerance can develop with use of pain medications and this simply means that it requires more and more of the medication to control pain; however, this is seen more in patients that use opioids for longer periods of time.  o Opioid dependence can develop with use of Opioids and this simply means that to stop the medication can cause withdrawal symptoms; however, this is seen with patients that use Opioids for longer periods of time.  o Opioid addiction can develop with use of Opioids and the incidence of this is very unlikely in patients who take the medications as ordered and stop the medications as instructed.  o Opioid overdose can be dangerous, but is unlikely when the medication is taken as ordered and stopped when ordered. It is important not to mix opioids with alcohol or with and type of sedative such as Benadryl as this can lead to over sedation and respiratory difficulty.  ? DOSAGE:   o Pain medications will need to be taken consistently for the first week to decrease pain and promote adequate pain relief and participation in physical therapy.  o After the initial surgical pain begins to resolve, you may begin to decrease the pain medication. By the end of 6-8 weeks, you should be off of pain medications.  o Refills will not be given by the office during evening hours, on weekends, or after 6-8 weeks post-op.  o To seek refills on pain medications during the initial 6 week post-operative period, you must call the office 48 hours in advance to request the refill. The office will  then notify you when to  the prescription. DO NOT wait until you are out of the medication to request a refill.    V. FOLLOW-UP VISITS:  ? You will need to follow up in the office with your surgeon in 3 weeks. Please call this number [phone]  to schedule this appointment.  If you have any concerns or suspected complications prior to your follow up visit, please call your surgeons office. Do not wait until your appointment time if you suspect complications. These will need to be addressed in the office promptly.    Discharge Medications:    1) Norco 7.5/325 mg 1-2 po q 4-6 hours prn pain  2)  Enteric Coated Aspirin 325 mg and plavix for dvt prophylaxis    CC:Abbey Pollard MD:Georgi Mendez MD

## 2017-09-04 NOTE — PLAN OF CARE
Problem: Patient Care Overview (Adult)  Goal: Plan of Care Review    09/04/17 0914   Coping/Psychosocial Response Interventions   Plan Of Care Reviewed With patient   Outcome Evaluation   Outcome Summary/Follow up Plan Pt demonstrates improving activity toleranceand independence w/ mobility during PT. Pt discharging to [facility] today.

## 2017-09-04 NOTE — PLAN OF CARE
Problem: Patient Care Overview (Adult)  Goal: Plan of Care Review  Outcome: Ongoing (interventions implemented as appropriate)    09/04/17 0208   Coping/Psychosocial Response Interventions   Plan Of Care Reviewed With patient   Patient Care Overview   Progress improving   Outcome Evaluation   Outcome Summary/Follow up Plan Pain well controlled with pain pill. Dressing clean, dry and intact. Able to ambulate with walker X 1 assist. Discharge to Rehab today. Educated patient on the importance of BP monitoring for history of Hypertension.       Goal: Adult Individualization and Mutuality  Outcome: Ongoing (interventions implemented as appropriate)  Goal: Discharge Needs Assessment  Outcome: Ongoing (interventions implemented as appropriate)    Problem: Fall Risk (Adult)  Goal: Absence of Falls  Outcome: Ongoing (interventions implemented as appropriate)    Problem: Hip Replacement, Total (Adult)  Goal: Signs and Symptoms of Listed Potential Problems Will be Absent or Manageable (Hip Replacement, Total)  Outcome: Ongoing (interventions implemented as appropriate)

## 2017-09-04 NOTE — PROGRESS NOTES
"Ortho POD 3    Patients Name:  Juanis Ortega  YOB: 1940  Medical Records Number:  [id number]    No complaints    /73 (BP Location: Right arm, Patient Position: Lying)  Pulse 77  Temp 97.5 °F (36.4 °C) (Oral)   Resp 16  Ht 61\" (154.9 cm)  Wt 186 lb 7 oz (84.6 kg)  SpO2 97%  BMI 35.23 kg/m2    RLE:  NVI, calf nontender, Sensation intact  No signs of DVT    Incision: clean, no infection    Lab Results (last 24 hours)     ** No results found for the last 24 hours. **          S/p Right TASNEEM  WBAT with walker  ASA for DVT prophylaxis  Rehab today  "

## 2024-02-27 ENCOUNTER — APPOINTMENT (RX ONLY)
Dept: URBAN - METROPOLITAN AREA CLINIC 52 | Facility: CLINIC | Age: 84
Setting detail: DERMATOLOGY
End: 2024-02-27

## 2024-02-27 DIAGNOSIS — L30.9 DERMATITIS, UNSPECIFIED: ICD-10-CM | Status: INADEQUATELY CONTROLLED

## 2024-02-27 PROCEDURE — ? PRESCRIPTION MEDICATION MANAGEMENT

## 2024-02-27 PROCEDURE — 99204 OFFICE O/P NEW MOD 45 MIN: CPT

## 2024-02-27 PROCEDURE — ? PRESCRIPTION

## 2024-02-27 PROCEDURE — ? COUNSELING

## 2024-02-27 RX ORDER — TRIAMCINOLONE ACETONIDE 1 MG/G
1 CREAM TOPICAL BID
Qty: 454 | Refills: 1 | Status: ERX | COMMUNITY
Start: 2024-02-27

## 2024-02-27 RX ORDER — CLOBETASOL PROPIONATE 0.5 MG/ML
1 SOLUTION TOPICAL BID
Qty: 50 | Refills: 1 | Status: ERX | COMMUNITY
Start: 2024-02-27

## 2024-02-27 RX ORDER — HYDROXYZINE HYDROCHLORIDE 10 MG/1
1 TABLET, FILM COATED ORAL QHS
Qty: 30 | Refills: 0 | Status: ERX | COMMUNITY
Start: 2024-02-27

## 2024-02-27 RX ADMIN — TRIAMCINOLONE ACETONIDE 1: 1 CREAM TOPICAL at 00:00

## 2024-02-27 RX ADMIN — HYDROXYZINE HYDROCHLORIDE 1: 10 TABLET, FILM COATED ORAL at 00:00

## 2024-02-27 RX ADMIN — CLOBETASOL PROPIONATE 1: 0.5 SOLUTION TOPICAL at 00:00

## 2024-02-27 ASSESSMENT — LOCATION ZONE DERM
LOCATION ZONE: TRUNK
LOCATION ZONE: LEG
LOCATION ZONE: ARM

## 2024-02-27 ASSESSMENT — ITCH NUMERIC RATING SCALE: HOW SEVERE IS YOUR ITCHING?: 10

## 2024-02-27 ASSESSMENT — LOCATION DETAILED DESCRIPTION DERM
LOCATION DETAILED: MIDDLE STERNUM
LOCATION DETAILED: RIGHT SUPERIOR MEDIAL UPPER BACK
LOCATION DETAILED: RIGHT ANTERIOR PROXIMAL THIGH
LOCATION DETAILED: LEFT PROXIMAL DORSAL FOREARM
LOCATION DETAILED: RIGHT PROXIMAL DORSAL FOREARM
LOCATION DETAILED: LEFT ANTERIOR PROXIMAL THIGH

## 2024-02-27 ASSESSMENT — LOCATION SIMPLE DESCRIPTION DERM
LOCATION SIMPLE: LEFT THIGH
LOCATION SIMPLE: RIGHT THIGH
LOCATION SIMPLE: CHEST
LOCATION SIMPLE: RIGHT UPPER BACK
LOCATION SIMPLE: RIGHT FOREARM
LOCATION SIMPLE: LEFT FOREARM

## 2024-02-27 ASSESSMENT — BSA RASH: BSA RASH: 36

## 2024-02-27 NOTE — PROCEDURE: PRESCRIPTION MEDICATION MANAGEMENT
Render In Strict Bullet Format?: No
Initiate Treatment: triamcinolone acetonide 0.1 % topical cream Bid\\nQuantity: 454.0 g  Days Supply: 30\\nSig: Apply to itchy areas on body BID x 2 weeks, then PRN . Do not use on face or groin.\\n\\nclobetasol 0.05 % scalp solution BID\\nQuantity: 50.0 ml  Days Supply: 90\\nSig: AAA on scalp BID x 2 weeks, then PRN flares\\n\\nhydroxyzine HCl 10 mg tablet QHS\\nQuantity: 30.0 Tablet  Days Supply: 30\\nSig: Take one pill once daily at bedtime prn itch.
Detail Level: Zone

## (undated) DEVICE — SKIN PREP TRAY W/CHG: Brand: MEDLINE INDUSTRIES, INC.

## (undated) DEVICE — GLV SURG BIOGEL LTX PF 8

## (undated) DEVICE — MAT FLR ABSORBENT LG 4FT 10 2.5FT

## (undated) DEVICE — DRSNG WND BORDR/ADHS NONADHR/GZ LF 4X14IN STRL

## (undated) DEVICE — SOL ISO/ALC RUB 70PCT 4OZ

## (undated) DEVICE — SYR CONTRL LUERLOK 10CC

## (undated) DEVICE — GLV SURG TRIUMPH CLASSIC PF LTX 8 STRL

## (undated) DEVICE — STPLR SKIN VISISTAT WD 35CT

## (undated) DEVICE — 1016 S-DRAPE IRRIG POUCH 10/BOX: Brand: STERI-DRAPE™

## (undated) DEVICE — 3M™ IOBAN™ 2 ANTIMICROBIAL INCISE DRAPE 6650EZ: Brand: IOBAN™ 2

## (undated) DEVICE — UNDERCAST PADDING: Brand: DEROYAL

## (undated) DEVICE — SUT VIC 0 CT1 36IN J946H

## (undated) DEVICE — APPL CHLORAPREP W/TINT 26ML ORNG

## (undated) DEVICE — DUAL CUT SAGITTAL BLADE

## (undated) DEVICE — BIPOLAR SEALER 23-112-1 AQM 6.0: Brand: AQUAMANTYS™

## (undated) DEVICE — ENCORE® LATEX ORTHO SIZE 8, STERILE LATEX POWDER-FREE SURGICAL GLOVE: Brand: ENCORE

## (undated) DEVICE — DRSNG WND GEL FIBR OPTICELL AG PLS W/SLV LF 4X5IN  STRL

## (undated) DEVICE — SHEET, DRAPE, SPLIT, STERILE: Brand: MEDLINE

## (undated) DEVICE — PK ANT HIP 40

## (undated) DEVICE — PK ATS CUST W CARDIOTOMY RESEVOIR

## (undated) DEVICE — ANTIBACTERIAL UNDYED BRAIDED (POLYGLACTIN 910), SYNTHETIC ABSORBABLE SUTURE: Brand: COATED VICRYL

## (undated) DEVICE — 3M™ STERI-STRIP™ REINFORCED ADHESIVE SKIN CLOSURES, R1547, 1/2 IN X 4 IN (12 MM X 100 MM), 6 STRIPS/ENVELOPE: Brand: 3M™ STERI-STRIP™

## (undated) DEVICE — 1010 S-DRAPE TOWEL DRAPE 10/BX: Brand: STERI-DRAPE™

## (undated) DEVICE — Device

## (undated) DEVICE — PAD,ABDOMINAL,8"X10",ST,LF: Brand: MEDLINE

## (undated) DEVICE — SUT MNCRYL 3/0 PS2 18IN MCP497G